# Patient Record
Sex: MALE | Race: WHITE | Employment: FULL TIME | URBAN - METROPOLITAN AREA
[De-identification: names, ages, dates, MRNs, and addresses within clinical notes are randomized per-mention and may not be internally consistent; named-entity substitution may affect disease eponyms.]

---

## 2020-01-23 ENCOUNTER — OFFICE VISIT (OUTPATIENT)
Dept: FAMILY MEDICINE CLINIC | Age: 50
End: 2020-01-23
Payer: COMMERCIAL

## 2020-01-23 VITALS
WEIGHT: 268 LBS | DIASTOLIC BLOOD PRESSURE: 100 MMHG | HEART RATE: 105 BPM | RESPIRATION RATE: 16 BRPM | HEIGHT: 70 IN | BODY MASS INDEX: 38.37 KG/M2 | SYSTOLIC BLOOD PRESSURE: 139 MMHG | OXYGEN SATURATION: 97 %

## 2020-01-23 LAB
A/G RATIO: 1.2 (ref 1.1–2.2)
ALBUMIN SERPL-MCNC: 4.2 G/DL (ref 3.4–5)
ALP BLD-CCNC: 143 U/L (ref 40–129)
ALT SERPL-CCNC: 23 U/L (ref 10–40)
ANION GAP SERPL CALCULATED.3IONS-SCNC: 16 MMOL/L (ref 3–16)
AST SERPL-CCNC: 20 U/L (ref 15–37)
BILIRUB SERPL-MCNC: 1.1 MG/DL (ref 0–1)
BUN BLDV-MCNC: 14 MG/DL (ref 7–20)
CALCIUM SERPL-MCNC: 9.4 MG/DL (ref 8.3–10.6)
CHLORIDE BLD-SCNC: 96 MMOL/L (ref 99–110)
CHOLESTEROL, TOTAL: 133 MG/DL (ref 0–199)
CO2: 24 MMOL/L (ref 21–32)
CREAT SERPL-MCNC: 0.8 MG/DL (ref 0.9–1.3)
GFR AFRICAN AMERICAN: >60
GFR NON-AFRICAN AMERICAN: >60
GLOBULIN: 3.6 G/DL
GLUCOSE BLD-MCNC: 98 MG/DL (ref 70–99)
HDLC SERPL-MCNC: 30 MG/DL (ref 40–60)
LDL CHOLESTEROL CALCULATED: 86 MG/DL
POTASSIUM SERPL-SCNC: 4.4 MMOL/L (ref 3.5–5.1)
SODIUM BLD-SCNC: 136 MMOL/L (ref 136–145)
TOTAL PROTEIN: 7.8 G/DL (ref 6.4–8.2)
TRIGL SERPL-MCNC: 87 MG/DL (ref 0–150)
URIC ACID, SERUM: 7.7 MG/DL (ref 3.5–7.2)
VLDLC SERPL CALC-MCNC: 17 MG/DL

## 2020-01-23 PROCEDURE — G8484 FLU IMMUNIZE NO ADMIN: HCPCS | Performed by: INTERNAL MEDICINE

## 2020-01-23 PROCEDURE — 1036F TOBACCO NON-USER: CPT | Performed by: INTERNAL MEDICINE

## 2020-01-23 PROCEDURE — 36415 COLL VENOUS BLD VENIPUNCTURE: CPT | Performed by: INTERNAL MEDICINE

## 2020-01-23 PROCEDURE — 99203 OFFICE O/P NEW LOW 30 MIN: CPT | Performed by: INTERNAL MEDICINE

## 2020-01-23 PROCEDURE — G8419 CALC BMI OUT NRM PARAM NOF/U: HCPCS | Performed by: INTERNAL MEDICINE

## 2020-01-23 PROCEDURE — G8427 DOCREV CUR MEDS BY ELIG CLIN: HCPCS | Performed by: INTERNAL MEDICINE

## 2020-01-23 RX ORDER — INDOMETHACIN 50 MG/1
50 CAPSULE ORAL 3 TIMES DAILY
COMMUNITY
Start: 2019-11-26 | End: 2020-01-23 | Stop reason: SDUPTHER

## 2020-01-23 RX ORDER — INDOMETHACIN 50 MG/1
50 CAPSULE ORAL 3 TIMES DAILY
Qty: 21 CAPSULE | Refills: 0 | Status: SHIPPED | OUTPATIENT
Start: 2020-01-23 | End: 2020-02-06 | Stop reason: SDUPTHER

## 2020-01-23 RX ORDER — COVID-19 ANTIGEN TEST
220 KIT MISCELLANEOUS
COMMUNITY
End: 2021-05-07

## 2020-01-23 SDOH — HEALTH STABILITY: MENTAL HEALTH: HOW MANY STANDARD DRINKS CONTAINING ALCOHOL DO YOU HAVE ON A TYPICAL DAY?: 1 OR 2

## 2020-01-23 SDOH — HEALTH STABILITY: MENTAL HEALTH: HOW OFTEN DO YOU HAVE A DRINK CONTAINING ALCOHOL?: MONTHLY OR LESS

## 2020-01-23 ASSESSMENT — PATIENT HEALTH QUESTIONNAIRE - PHQ9
SUM OF ALL RESPONSES TO PHQ QUESTIONS 1-9: 0
2. FEELING DOWN, DEPRESSED OR HOPELESS: 0
SUM OF ALL RESPONSES TO PHQ QUESTIONS 1-9: 0
1. LITTLE INTEREST OR PLEASURE IN DOING THINGS: 0
SUM OF ALL RESPONSES TO PHQ9 QUESTIONS 1 & 2: 0

## 2020-01-23 NOTE — PROGRESS NOTES
Estela Gurrola   1970      Reason for visit:   Chief Complaint   Patient presents with    [facility]    Gout    Medication Refill    Back Pain        HPI:  Patient presents to [facility]. He has a history of gout, both musculoskeletal well as uric acid stones that required intervention in the past.  He was previously on allopurinol but due to insurance changes has been off of it for quite some time. Reports present attack in his left knee for the last 2 to 3 weeks. Initially thought it was related to a meniscal tear he had in that knee in the past that was causing swelling, however, the pain worsened and is now red and warm. No fever. Decreased range of motion due to pain. Ports it feels similar to prior gout attacks. He reports Indocin worked very well for him in the past.  He has been taking naproxen/Aleve outside of the office but has not had full relief. He has a history of lumbar disc disease with radiculopathy. He had several herniated disc that are reportedly pressing on his spinal cord. He has been offered surgery per Dr. Dasha Ruiz but it is a Workmen's Comp. case and he reports that he is not yet given approval.  Is currently disabled from this injury and cannot work. He is a non-smoker. He tries to avoid foods that exacerbate his gout including alcohol. He declines a flu shot. His Tdap is due this summer.     Past Medical History:   Diagnosis Date    Gout     Hypertension            Current Outpatient Medications:     Naproxen Sodium (ALEVE) 220 MG CAPS, Take 220 mg by mouth, Disp: , Rfl:     indomethacin (INDOCIN) 50 MG capsule, Take 1 capsule by mouth 3 times daily, Disp: 21 capsule, Rfl: 0    ondansetron (ZOFRAN) 4 MG tablet, Take 1 tablet by mouth every 8 hours as needed for Nausea (Patient not taking: Reported on 1/23/2020), Disp: 20 tablet, Rfl: 0     Allergies   Allergen Reactions    Dilaudid [Hydromorphone Hcl]        Past Surgical History: Procedure Laterality Date    KIDNEY STONE SURGERY      KNEE CARTILAGE SURGERY Right 2018        Family History   Problem Relation Age of Onset    Hypertension Mother     Hypertension Father         Social History     Socioeconomic History    Marital status:      Spouse name: Not on file    Number of children: Not on file    Years of education: Not on file    Highest education level: Not on file   Occupational History    Not on file   Social Needs    Financial resource strain: Not on file    Food insecurity:     Worry: Not on file     Inability: Not on file    Transportation needs:     Medical: Not on file     Non-medical: Not on file   Tobacco Use    Smoking status: Never Smoker    Smokeless tobacco: Never Used   Substance and Sexual Activity    Alcohol use: Yes     Frequency: Monthly or less     Drinks per session: 1 or 2     Binge frequency: Never    Drug use: No    Sexual activity: Yes   Lifestyle    Physical activity:     Days per week: Not on file     Minutes per session: Not on file    Stress: Not on file   Relationships    Social connections:     Talks on phone: Not on file     Gets together: Not on file     Attends Caodaism service: Not on file     Active member of club or organization: Not on file     Attends meetings of clubs or organizations: Not on file     Relationship status: Not on file    Intimate partner violence:     Fear of current or ex partner: Not on file     Emotionally abused: Not on file     Physically abused: Not on file     Forced sexual activity: Not on file   Other Topics Concern    Not on file   Social History Narrative    Not on file       Review of Systems   Constitutional: Negative for chills, fatigue, fever and unexpected weight change. HENT: Negative for congestion, ear pain, sore throat and trouble swallowing. Eyes: Negative for pain and redness. Respiratory: Negative for cough and shortness of breath.     Cardiovascular: Negative for chest

## 2020-01-29 ENCOUNTER — TELEPHONE (OUTPATIENT)
Dept: FAMILY MEDICINE CLINIC | Age: 50
End: 2020-01-29

## 2020-02-06 RX ORDER — INDOMETHACIN 50 MG/1
50 CAPSULE ORAL 3 TIMES DAILY
Qty: 21 CAPSULE | Refills: 0 | Status: SHIPPED | OUTPATIENT
Start: 2020-02-06 | End: 2020-03-05 | Stop reason: SDUPTHER

## 2020-02-06 NOTE — TELEPHONE ENCOUNTER
Patient requesting a medication refill.   Medication: Indomethacin  Dosage: 50 mg  Frequency: Take one capsule by mouth 3 times daily  Last filled on: 01/23/2020  Pharmacy: [facility]  Next office visit: 03/05/2020

## 2020-02-06 NOTE — TELEPHONE ENCOUNTER
Spoke with patient he says its just about gone but he leaves to go out of town Saturday and would like to have on hand in case he has a flare up.

## 2020-03-05 ENCOUNTER — OFFICE VISIT (OUTPATIENT)
Dept: FAMILY MEDICINE CLINIC | Age: 50
End: 2020-03-05
Payer: COMMERCIAL

## 2020-03-05 VITALS
BODY MASS INDEX: 39.03 KG/M2 | SYSTOLIC BLOOD PRESSURE: 143 MMHG | RESPIRATION RATE: 16 BRPM | WEIGHT: 272 LBS | HEART RATE: 90 BPM | OXYGEN SATURATION: 98 % | DIASTOLIC BLOOD PRESSURE: 96 MMHG

## 2020-03-05 PROCEDURE — G8484 FLU IMMUNIZE NO ADMIN: HCPCS | Performed by: INTERNAL MEDICINE

## 2020-03-05 PROCEDURE — G8427 DOCREV CUR MEDS BY ELIG CLIN: HCPCS | Performed by: INTERNAL MEDICINE

## 2020-03-05 PROCEDURE — G8417 CALC BMI ABV UP PARAM F/U: HCPCS | Performed by: INTERNAL MEDICINE

## 2020-03-05 PROCEDURE — 1036F TOBACCO NON-USER: CPT | Performed by: INTERNAL MEDICINE

## 2020-03-05 PROCEDURE — 99213 OFFICE O/P EST LOW 20 MIN: CPT | Performed by: INTERNAL MEDICINE

## 2020-03-05 RX ORDER — LOSARTAN POTASSIUM 25 MG/1
25 TABLET ORAL DAILY
Qty: 30 TABLET | Refills: 3 | Status: SHIPPED | OUTPATIENT
Start: 2020-03-05 | End: 2020-05-05

## 2020-03-05 RX ORDER — INDOMETHACIN 50 MG/1
50 CAPSULE ORAL 3 TIMES DAILY
Qty: 21 CAPSULE | Refills: 0 | Status: SHIPPED | OUTPATIENT
Start: 2020-03-05 | End: 2020-09-11

## 2020-03-05 RX ORDER — ALLOPURINOL 100 MG/1
200 TABLET ORAL DAILY
Qty: 60 TABLET | Refills: 2 | Status: SHIPPED | OUTPATIENT
Start: 2020-03-05 | End: 2020-05-05

## 2020-03-05 ASSESSMENT — ENCOUNTER SYMPTOMS
SINUS PAIN: 0
SHORTNESS OF BREATH: 0
SORE THROAT: 0
WHEEZING: 0
COUGH: 0
NAUSEA: 0
ABDOMINAL PAIN: 0
VOMITING: 0
EYE DISCHARGE: 0
SINUS PRESSURE: 0
EYE REDNESS: 0
DIARRHEA: 0
RHINORRHEA: 0

## 2020-03-05 NOTE — PATIENT INSTRUCTIONS
Patient Education        Purine-Restricted Diet: Care Instructions  Your Care Instructions    Purines are substances that are found in some foods. Your body turns purines into uric acid. High levels of uric acid can cause gout, which is a form of arthritis that causes pain and inflammation in joints. You may be able to help control the amount of uric acid in your body by limiting high-purine foods in your diet. Follow-up care is a key part of your treatment and safety. Be sure to make and go to all appointments, and call your doctor if you are having problems. It's also a good idea to know your test results and keep a list of the medicines you take. How can you care for yourself at home? · Plan your meals and snacks around foods that are low in purines and are safe for you to eat. These foods include:  ? Green vegetables and tomatoes. ? Fruits. ? Whole-grain breads, rice, and cereals. ? Eggs, peanut butter, and nuts. ? Low-fat milk, cheese, and other milk products. ? Popcorn. ? Gelatin desserts, chocolate, cocoa, and cakes and sweets, in small amounts. · You can eat certain foods that are medium-high in purines, but eat them only once in a while. These foods include:  ? Legumes, such as dried beans and dried peas. You can have 1 cup cooked legumes each day. ? Asparagus, cauliflower, spinach, mushrooms, and green peas. ? Fish and seafood (other than very high-purine seafood). ? Oatmeal, wheat bran, and wheat germ. · Limit very high-purine foods, including:  ? Organ meats, such as liver, kidneys, sweetbreads, and brains. ? Meats, including baer, beef, pork, and lamb. ? Game meats and any other meats in large amounts. ? Anchovies, sardines, herring, mackerel, and scallops. ? Gravy. ? Beer. Where can you learn more? Go to [email or web address] and sign in to your [employer] account.  Enter F448 in the [facility] box to learn more about \"Purine-Restricted Diet: Care

## 2020-03-05 NOTE — PROGRESS NOTES
3/5/2020     Lang Gurrola (:  1970) is a 51 y.o. male, here for evaluation of the following medical concerns:    HPI  Presents for follow-up of recent gout attack. He had gout in his left knee at last visit. He confirmed that this was previously diagnosed with an arthrocentesis many years ago. He has had multiple flares in the past as well as multiple kidney stones. Unfortunately, has had 2 more flares since her last visit, most recently in the left elbow. Is still a bit red and swollen. He states that he has been sparingly using the Indocin but feels that he could stop the attack if he had a refill. His blood pressure remains elevated. He had been on medication for his blood pressure in the past, he recalls metoprolol was started by cardiologist but he reports that it actually made his blood pressure worse. No recent chest pain or shortness of breath. No fever. Review of Systems   Constitutional: Negative for chills and fever. HENT: Negative for congestion, ear pain, rhinorrhea, sinus pressure, sinus pain and sore throat. Eyes: Negative for discharge and redness. Respiratory: Negative for cough, shortness of breath and wheezing. Cardiovascular: Negative for chest pain, palpitations and leg swelling. Gastrointestinal: Negative for abdominal pain, diarrhea, nausea and vomiting. Genitourinary: Negative for dysuria. Musculoskeletal: Positive for arthralgias. Negative for myalgias. Skin: Negative for rash. Prior to Visit Medications    Medication Sig Taking?  Authorizing Provider   allopurinol (ZYLOPRIM) 100 MG tablet Take 2 tablets by mouth daily Yes Kevon Nolasco MD   indomethacin (INDOCIN) 50 MG capsule Take 1 capsule by mouth 3 times daily Yes Kevon Nolasco MD   losartan (COZAAR) 25 MG tablet Take 1 tablet by mouth daily Yes Kevon Nolasco MD   Naproxen Sodium (ALEVE) 220 MG CAPS Take 220 mg by mouth  Historical Provider, MD   ondansetron [facility] 4 MG tablet Take 1 tablet by mouth every 8 hours as needed for Nausea  Patient not taking: Reported on 1/23/2020  Ivet Parekh MD        Social History     Tobacco Use    Smoking status: Never Smoker    Smokeless tobacco: Never Used   Substance Use Topics    Alcohol use: Yes     Frequency: Monthly or less     Drinks per session: 1 or 2     Binge frequency: Never        Vitals:    03/05/20 1540 03/05/20 1544   BP: (!) 196/102 (!) 143/96   Pulse: 102 90   Resp: 16    SpO2: 98%    Weight: 272 lb (123.4 kg)      Estimated body mass index is 39.03 kg/m² as calculated from the following:    Height as of 1/23/20: 5' 10\" (1.778 m). Weight as of this encounter: 272 lb (123.4 kg). Physical Exam  Constitutional:       Appearance: Normal appearance. Cardiovascular:      Rate and Rhythm: Normal rate and regular rhythm. Pulses: Normal pulses. Heart sounds: No murmur. Pulmonary:      Effort: Pulmonary effort is normal. No respiratory distress. Breath sounds: Normal breath sounds. No wheezing. Musculoskeletal:         General: Swelling and tenderness present. Right lower leg: No edema. Left lower leg: No edema. Comments: Redness, swelling, tenderness of left elbow   Neurological:      Mental Status: He is alert. ASSESSMENT/PLAN:  1. Acute gout of left knee, unspecified cause  He needs to wait at least several weeks after this acute flare resolves. At that time, he will start allopurinol. He can start 1 tablet daily and as long as he does not have a precipitant attack, he can increase to 2 tablets and we will plan to recheck his uric acid at follow-up. He will call in the interim for any issues. - indomethacin (INDOCIN) 50 MG capsule; Take 1 capsule by mouth 3 times daily  Dispense: 21 capsule; Refill: 0    2. Essential hypertension  Pressure is above goal.  Recommend losartan therapy. Potential adverse effects reviewed. He will notify me should he develop.

## 2020-04-27 ENCOUNTER — TELEPHONE (OUTPATIENT)
Dept: FAMILY MEDICINE CLINIC | Age: 50
End: 2020-04-27

## 2020-05-05 ENCOUNTER — VIRTUAL VISIT (OUTPATIENT)
Dept: FAMILY MEDICINE CLINIC | Age: 50
End: 2020-05-05
Payer: COMMERCIAL

## 2020-05-05 PROCEDURE — G8427 DOCREV CUR MEDS BY ELIG CLIN: HCPCS | Performed by: INTERNAL MEDICINE

## 2020-05-05 PROCEDURE — 99214 OFFICE O/P EST MOD 30 MIN: CPT | Performed by: INTERNAL MEDICINE

## 2020-05-05 RX ORDER — LOSARTAN POTASSIUM 100 MG/1
100 TABLET ORAL DAILY
Qty: 30 TABLET | Refills: 5 | Status: SHIPPED | OUTPATIENT
Start: 2020-05-05 | End: 2020-10-19

## 2020-05-05 RX ORDER — ALLOPURINOL 300 MG/1
300 TABLET ORAL DAILY
Qty: 30 TABLET | Refills: 5 | Status: SHIPPED | OUTPATIENT
Start: 2020-05-05 | End: 2020-11-03

## 2020-05-05 ASSESSMENT — ENCOUNTER SYMPTOMS: SHORTNESS OF BREATH: 0

## 2020-05-05 NOTE — PROGRESS NOTES
2020    TELEHEALTH EVALUATION -- Audio/Visual (During ZEUES-85 public health emergency)    HPI:    Petr Fabian (:  1970) has requested an audio/video evaluation for the following concern(s):    Patient presents for follow-up of hypertension and gout. He was started on allopurinol prophylaxis after his last attack. He was feeling good for a while and was able to start the medication without precipitating a flare. He is taking allopurinol 200 mg and feels that it has really helped decrease his flares but he still having some intermittent gout flares in his elbow. He is doing well on losartan therapy. No adverse effects but he has noted that his blood pressure is still high. He had initially taken 2 tablets of the losartan as he thought that is what he was supposed to do reports he did not really have any major impact on his blood pressure. Recent readings are as follows  154/103  160/114  Does have concerns today regarding insomnia and vivid dreams. He reports that he wakes up frequently throughout the night and then cannot fall back asleep. He does snore. His partner has witnessed apnea in the past.  He is sleepy in the daytime. He is obese. He has a large neck circumference. he denies anxiety/depression  Review of Systems   Respiratory: Negative for shortness of breath. Cardiovascular: Negative for chest pain and leg swelling. Musculoskeletal: Positive for arthralgias and joint swelling. Neurological: Negative for dizziness and light-headedness. Psychiatric/Behavioral: Positive for sleep disturbance. Negative for dysphoric mood and suicidal ideas. The patient is not nervous/anxious. Prior to Visit Medications    Medication Sig Taking?  Authorizing Provider   allopurinol (ZYLOPRIM) 300 MG tablet Take 1 tablet by mouth daily Yes Gianni Miner MD   losartan (COZAAR) 100 MG tablet Take 1 tablet by mouth daily Yes Gianni Miner MD   indomethacin (INDOCIN) 50 MG

## 2020-09-14 RX ORDER — INDOMETHACIN 50 MG/1
CAPSULE ORAL
Qty: 90 CAPSULE | Refills: 3 | Status: SHIPPED | OUTPATIENT
Start: 2020-09-14 | End: 2021-05-07 | Stop reason: SDUPTHER

## 2020-09-14 NOTE — TELEPHONE ENCOUNTER
Can you find out from patient if he is still taking this? Should not be taking regularly, only as needed. Can you call pharmacy to change rx to TID PRN for gout flare? No refills.  I apologize

## 2020-09-15 NOTE — TELEPHONE ENCOUNTER
PT's wife Argelia Cho called in (Okay to speak to per HIPAA) regarding refill for this medication. She says that PT is only taking as needed.  Did adv that refill was sent to the pharmacy yesterday

## 2020-10-19 ENCOUNTER — TELEPHONE (OUTPATIENT)
Dept: FAMILY MEDICINE CLINIC | Age: 50
End: 2020-10-19

## 2020-10-19 NOTE — TELEPHONE ENCOUNTER
PT's wife Elena Suarez called in stating PT's blood pressure has been running high. He's been taking his blood pressure medication every morning. Elena Daniela stated his most recent blood pressure reading was 158/105. She's not sure if he needs an adjustment with his medication or not.      Please call Elena Suarez back: [phone]

## 2020-10-20 RX ORDER — AMLODIPINE BESYLATE 5 MG/1
5 TABLET ORAL DAILY
Qty: 30 TABLET | Refills: 0 | Status: SHIPPED | OUTPATIENT
Start: 2020-10-20 | End: 2020-11-16

## 2020-11-03 RX ORDER — ALLOPURINOL 300 MG/1
TABLET ORAL
Qty: 30 TABLET | Refills: 4 | Status: SHIPPED | OUTPATIENT
Start: 2020-11-03 | End: 2021-03-31

## 2020-11-17 RX ORDER — AMLODIPINE BESYLATE 5 MG/1
TABLET ORAL
Qty: 30 TABLET | Refills: 0 | Status: SHIPPED | OUTPATIENT
Start: 2020-11-17 | End: 2020-12-02 | Stop reason: SDUPTHER

## 2020-11-24 RX ORDER — AMLODIPINE BESYLATE 5 MG/1
TABLET ORAL
Qty: 30 TABLET | Refills: 0 | OUTPATIENT
Start: 2020-11-24

## 2020-12-02 ENCOUNTER — OFFICE VISIT (OUTPATIENT)
Dept: FAMILY MEDICINE CLINIC | Age: 50
End: 2020-12-02
Payer: COMMERCIAL

## 2020-12-02 VITALS
SYSTOLIC BLOOD PRESSURE: 166 MMHG | WEIGHT: 293 LBS | HEIGHT: 70 IN | TEMPERATURE: 97.7 F | HEART RATE: 99 BPM | BODY MASS INDEX: 41.95 KG/M2 | DIASTOLIC BLOOD PRESSURE: 107 MMHG

## 2020-12-02 PROCEDURE — 1036F TOBACCO NON-USER: CPT | Performed by: FAMILY MEDICINE

## 2020-12-02 PROCEDURE — 99213 OFFICE O/P EST LOW 20 MIN: CPT | Performed by: FAMILY MEDICINE

## 2020-12-02 PROCEDURE — G8417 CALC BMI ABV UP PARAM F/U: HCPCS | Performed by: FAMILY MEDICINE

## 2020-12-02 PROCEDURE — G8484 FLU IMMUNIZE NO ADMIN: HCPCS | Performed by: FAMILY MEDICINE

## 2020-12-02 PROCEDURE — G8427 DOCREV CUR MEDS BY ELIG CLIN: HCPCS | Performed by: FAMILY MEDICINE

## 2020-12-02 RX ORDER — LOSARTAN POTASSIUM 100 MG/1
TABLET ORAL
Qty: 30 TABLET | Refills: 0 | Status: SHIPPED | OUTPATIENT
Start: 2020-12-02 | End: 2021-01-04 | Stop reason: SDUPTHER

## 2020-12-02 RX ORDER — AMLODIPINE BESYLATE 5 MG/1
TABLET ORAL
Qty: 30 TABLET | Refills: 0 | Status: SHIPPED | OUTPATIENT
Start: 2020-12-02 | End: 2021-01-04 | Stop reason: SDUPTHER

## 2020-12-02 NOTE — PROGRESS NOTES
PROGRESS NOTE     Michelle Peacock MD  [address] [facility]  [place]   [phone] office  [phone] fax    Date of Service:  12/2/2020    Subjective:      Patient ID: Dunia Bernardo is a 53 y.o. male      CC: HTN,acute illness    HPI   Of note, medical assistant was able to room patient and get his vitals, but noticed he was coughing. Once it was discovered he had a respiratory illness, I let him know we would have to do the rest of the visit virtually. Patient was escorted out to his car, at which point I called him on the telephone to finish the encounter. Acute illness  Patient has been coughing up mucus for the last 5 days. He notices some nasal congestion and headache as well. He denies sinus pain or pressure, neck pain, myalgias, fever, rhinitis, chest pain, shortness of breath, abdominal pain, loss of taste or smell, nausea, vomiting, diarrhea        Hypertension:    Amlodipine 5mg was added to losartan 100mg via telephone by Dr Kenny Amaya in getting of October 2020. Patient did not understand the directions, and stopped his losartan 100 mg completely. He states his blood pressure has been high since. Patient misunderstood and stopped his losartan altogether. He is not adherent to a low sodium diet. Patient denies chest pain, shortness of breath, lightheadedness, blurred vision, peripheral edema, palpitations, dry cough and fatigue. Antihypertensive medication side effects: no medication side effects noted. Use of agents associated with hypertension: none.                                         Sodium (mmol/L)   Date Value   01/23/2020 136    BUN (mg/dL)   Date Value   01/23/2020 14    Glucose (mg/dL)   Date Value   01/23/2020 98      Potassium (mmol/L)   Date Value   01/23/2020 4.4    CREATININE (mg/dL)   Date Value   01/23/2020 0.8 (L)             Vitals:    12/02/20 1445   BP: (!) 166/107   Pulse: 99   Temp: 97.7 °F (36.5 °C) schedule. Reviewed CDC isolation guidelines. Patient expressed understanding. Did review red flag symptoms of COVID-19, and when to seek emergency care. Patient expressed understanding.

## 2020-12-14 ENCOUNTER — TELEPHONE (OUTPATIENT)
Dept: FAMILY MEDICINE CLINIC | Age: 50
End: 2020-12-14

## 2020-12-14 NOTE — TELEPHONE ENCOUNTER
Ok to reschedule the first week of January    Please document call and then close encounter.   thanks

## 2020-12-14 NOTE — TELEPHONE ENCOUNTER
Received message from the call center. PT has an appointment scheduled for 12/23 with Dr. Maryjo Herndon but would like to move to the week of 12/28. No availabilities. Please call if PT is able to move appointment.      Best call back number: [phone]

## 2020-12-15 ENCOUNTER — TELEPHONE (OUTPATIENT)
Dept: FAMILY MEDICINE CLINIC | Age: 50
End: 2020-12-15

## 2020-12-15 NOTE — TELEPHONE ENCOUNTER
----- Message from Ryan Mckeon sent at 12/15/2020  2:24 PM EST -----  Subject: Message to Provider    QUESTIONS  Information for Provider? pt's wife called to reschedule tht appt for   12/23. No appts available in 1st week of Jan. Pt's wife said to schedule   even though it is for 2/1. This is a reschedule that was supposed to be   1st week in Jan.   ---------------------------------------------------------------------------  --------------  [address]  What is the best way for the office to contact you? OK to leave message on   voicemail  Preferred Call Back Phone Number? [phone]  ---------------------------------------------------------------------------  --------------  SCRIPT ANSWERS  Relationship to Patient?  Self

## 2020-12-15 NOTE — TELEPHONE ENCOUNTER
Please call pt and offer one of my same day visit slots the first week of January, assuming they still want to be seen then    Please document call and then close encounter.   thanks

## 2021-01-03 NOTE — PROGRESS NOTES
PROGRESS NOTE     Kim Dotson MD  [address] [facility]  [place] [address]  [phone] office  [phone] fax    Date of Service:  1/4/2021    Subjective:      Patient ID: . Aggie Isbell is a 53 y.o. male      CC: HTN    HPI       Hypertension:    --one month ago losartan 100mg was restarted and amlodipine 5mg was continued. Asked to f/u today for re-evaluation    --home BP now 140-150/80--100    --Patient states over the last couple of weeks he has noticed more bloating in his abdomen. He is concerned this is related to the new losartan medication that he is taking. He does admit to also changing his diet at this time. He is trying to eat more fruits and vegetables and less unhealthy food. He is not adherent to a low sodium diet. Patient denies chest pain, shortness of breath, lightheadedness, blurred vision, peripheral edema, palpitations, dry cough and fatigue. Antihypertensive medication side effects: no medication side effects noted. Use of agents associated with hypertension: none.                                         Sodium (mmol/L)   Date Value   01/23/2020 136    BUN (mg/dL)   Date Value   01/23/2020 14    Glucose (mg/dL)   Date Value   01/23/2020 98      Potassium (mmol/L)   Date Value   01/23/2020 4.4    CREATININE (mg/dL)   Date Value   01/23/2020 0.8 (L)             Vitals:    01/04/21 1259 01/04/21 1311   BP: (!) 173/96 136/87   Pulse: 112 102   Temp: 98.4 °F (36.9 °C)    TempSrc: Infrared    Weight: 297 lb (134.7 kg)    Height: 5' 10\" (1.778 m)        Outpatient Medications Marked as Taking for the 1/4/21 encounter (Office Visit) with Violetta Clinton MD   Medication Sig Dispense Refill    losartan (COZAAR) 100 MG tablet TAKE ONE TABLET BY MOUTH DAILY 30 tablet 0    amLODIPine (NORVASC) 5 MG tablet TAKE ONE TABLET BY MOUTH DAILY 30 tablet 0    allopurinol (ZYLOPRIM) 300 MG tablet TAKE ONE TABLET BY MOUTH DAILY 30 tablet 4  indomethacin (INDOCIN) 50 MG capsule TAKE ONE CAPSULE BY MOUTH THREE TIMES A DAY 90 capsule 3    Naproxen Sodium (ALEVE) 220 MG CAPS Take 220 mg by mouth         Past Medical History:   Diagnosis Date    Gout     Hypertension        Past Surgical History:   Procedure Laterality Date    KIDNEY STONE SURGERY      KNEE CARTILAGE SURGERY Right 2018       Social History     Tobacco Use    Smoking status: Never Smoker    Smokeless tobacco: Never Used   Substance Use Topics    Alcohol use: Yes     Frequency: Monthly or less     Drinks per session: 1 or 2     Binge frequency: Never       Family History   Problem Relation Age of Onset    Hypertension Mother     Hypertension Father            Review of Systems  See hpi    Objective:   Constitutional:   · Reviewed vitals above  · Well Nourished, well developed, no distress       Resp:  · Normal effort  Musculoskeletal:  · Normal Gait  Skin:  · No rashes on inspection  Psych:  · Normal mood and affect  · Normal insight and judgement    Assessment / Plan:     1. Essential hypertension  Improved but not completely controlled, especially with home blood pressures. Increasing amlodipine to 5 mg. Continuing losartan 100 mg. Checking BMP. Follow-up in 1 week for reevaluation. --Of note, told him I do not believe the losartan is causing abdominal bloating, this is likely due to his recent dietary changes. Did give him information on FODMAP diet to see if he can figure out triggers.     --checking COVID antibody as had resp illness a month ago and wants to see if this was COVID

## 2021-01-04 ENCOUNTER — OFFICE VISIT (OUTPATIENT)
Dept: FAMILY MEDICINE CLINIC | Age: 51
End: 2021-01-04
Payer: COMMERCIAL

## 2021-01-04 VITALS
DIASTOLIC BLOOD PRESSURE: 87 MMHG | BODY MASS INDEX: 42.52 KG/M2 | WEIGHT: 297 LBS | HEART RATE: 102 BPM | SYSTOLIC BLOOD PRESSURE: 136 MMHG | TEMPERATURE: 98.4 F | HEIGHT: 70 IN

## 2021-01-04 DIAGNOSIS — Z01.84 IMMUNITY STATUS TESTING: ICD-10-CM

## 2021-01-04 DIAGNOSIS — I10 ESSENTIAL HYPERTENSION: Primary | ICD-10-CM

## 2021-01-04 DIAGNOSIS — Z23 NEEDS FLU SHOT: ICD-10-CM

## 2021-01-04 LAB
ANION GAP SERPL CALCULATED.3IONS-SCNC: 14 MMOL/L (ref 3–16)
BUN BLDV-MCNC: 11 MG/DL (ref 7–20)
CALCIUM SERPL-MCNC: 9.3 MG/DL (ref 8.3–10.6)
CHLORIDE BLD-SCNC: 105 MMOL/L (ref 99–110)
CO2: 23 MMOL/L (ref 21–32)
CREAT SERPL-MCNC: 0.9 MG/DL (ref 0.9–1.3)
GFR AFRICAN AMERICAN: >60
GFR NON-AFRICAN AMERICAN: >60
GLUCOSE BLD-MCNC: 106 MG/DL (ref 70–99)
POTASSIUM SERPL-SCNC: 4.2 MMOL/L (ref 3.5–5.1)
SARS-COV-2 ANTIBODY, TOTAL: POSITIVE
SODIUM BLD-SCNC: 142 MMOL/L (ref 136–145)

## 2021-01-04 PROCEDURE — 1036F TOBACCO NON-USER: CPT | Performed by: FAMILY MEDICINE

## 2021-01-04 PROCEDURE — G8417 CALC BMI ABV UP PARAM F/U: HCPCS | Performed by: FAMILY MEDICINE

## 2021-01-04 PROCEDURE — 99213 OFFICE O/P EST LOW 20 MIN: CPT | Performed by: FAMILY MEDICINE

## 2021-01-04 PROCEDURE — G8484 FLU IMMUNIZE NO ADMIN: HCPCS | Performed by: FAMILY MEDICINE

## 2021-01-04 PROCEDURE — 36415 COLL VENOUS BLD VENIPUNCTURE: CPT | Performed by: FAMILY MEDICINE

## 2021-01-04 PROCEDURE — G8427 DOCREV CUR MEDS BY ELIG CLIN: HCPCS | Performed by: FAMILY MEDICINE

## 2021-01-04 RX ORDER — AMLODIPINE BESYLATE 10 MG/1
TABLET ORAL
Qty: 90 TABLET | Refills: 0 | Status: SHIPPED | OUTPATIENT
Start: 2021-01-04 | End: 2021-04-15 | Stop reason: SDUPTHER

## 2021-01-04 RX ORDER — LOSARTAN POTASSIUM 100 MG/1
TABLET ORAL
Qty: 90 TABLET | Refills: 0 | Status: SHIPPED | OUTPATIENT
Start: 2021-01-04 | End: 2021-04-28 | Stop reason: SDUPTHER

## 2021-01-06 ENCOUNTER — TELEPHONE (OUTPATIENT)
Dept: ADMINISTRATIVE | Age: 51
End: 2021-01-06

## 2021-01-24 DIAGNOSIS — I10 ESSENTIAL HYPERTENSION: ICD-10-CM

## 2021-01-25 RX ORDER — AMLODIPINE BESYLATE 5 MG/1
TABLET ORAL
Qty: 30 TABLET | Refills: 0 | OUTPATIENT
Start: 2021-01-25

## 2021-03-31 RX ORDER — ALLOPURINOL 300 MG/1
TABLET ORAL
Qty: 30 TABLET | Refills: 3 | Status: SHIPPED | OUTPATIENT
Start: 2021-03-31 | End: 2021-04-28 | Stop reason: SDUPTHER

## 2021-04-14 ENCOUNTER — TELEPHONE (OUTPATIENT)
Dept: FAMILY MEDICINE CLINIC | Age: 51
End: 2021-04-14

## 2021-04-14 NOTE — TELEPHONE ENCOUNTER
Subject: Appointment Request     Reason for Call: Routine Existing Condition Follow Up     QUESTIONS   Type of Appointment? Established Patient   Reason for appointment request? Available appointments did not meet   patient need   Additional Information for Provider? Pt would like to come in ASAP for   amLODIPine refill as he is running out- wants in person can only come in   after 3pm.   ---------------------------------------------------------------------------   --------------   CALL BACK INFO   What is the best way for the office to contact you? OK to leave message on   voicemail   Preferred Call Back Phone Number? [phone]   ---------------------------------------------------------------------------   --------------   SCRIPT ANSWERS   Relationship to Patient? Other   Representative Name? Wife- Kelsey Thompson   Additional information verified (besides Name and Date of Birth)? Address   Appointment reason? Well Care/Follow Ups   Select a Well Care/Follow Ups appointment reason? Adult Existing Condition   Follow Up [Diabetes    CHF    COPD    Hypertension/Blood Pressure Check]   (Is the patient requesting to be seen urgently for their symptoms?)? No   Is this follow up request related to routine Diabetes Management? No   Are you having any new concerns about your existing condition? No   Have you been diagnosed with    tested for    or told that you are suspected of having COVID-19 (Coronavirus)? Yes   Did your symptoms begin or have you been tested for COVID-19 in the last   10 days? No   Have you had a fever or taken medication to treat a fever within the past   3 days? No   Have you had a cough    shortness of breath or flu-like symptoms within the past 3 days? No   Do you currently have flu-like symptoms including fever or chills    cough    shortness of breath    or difficulty breathing    or new loss of taste or smell? No   (Service Expert - click yes below to proceed with [employer] As Usual   Scheduling)? Yes

## 2021-04-15 ENCOUNTER — TELEPHONE (OUTPATIENT)
Dept: FAMILY MEDICINE CLINIC | Age: 51
End: 2021-04-15

## 2021-04-15 DIAGNOSIS — I10 ESSENTIAL HYPERTENSION: ICD-10-CM

## 2021-04-15 RX ORDER — AMLODIPINE BESYLATE 10 MG/1
TABLET ORAL
Qty: 30 TABLET | Refills: 0 | Status: SHIPPED | OUTPATIENT
Start: 2021-04-15 | End: 2021-04-28 | Stop reason: SDUPTHER

## 2021-04-28 ENCOUNTER — TELEPHONE (OUTPATIENT)
Dept: FAMILY MEDICINE CLINIC | Age: 51
End: 2021-04-28

## 2021-04-28 DIAGNOSIS — I10 ESSENTIAL HYPERTENSION: ICD-10-CM

## 2021-04-28 DIAGNOSIS — M10.9 ACUTE GOUT OF LEFT KNEE, UNSPECIFIED CAUSE: ICD-10-CM

## 2021-04-28 RX ORDER — INDOMETHACIN 50 MG/1
CAPSULE ORAL
Qty: 270 CAPSULE | Refills: 3 | OUTPATIENT
Start: 2021-04-28

## 2021-04-28 RX ORDER — ALLOPURINOL 300 MG/1
TABLET ORAL
Qty: 90 TABLET | Refills: 3 | Status: SHIPPED | OUTPATIENT
Start: 2021-04-28 | End: 2022-02-04 | Stop reason: SDUPTHER

## 2021-04-28 RX ORDER — LOSARTAN POTASSIUM 100 MG/1
TABLET ORAL
Qty: 90 TABLET | Refills: 3 | Status: SHIPPED | OUTPATIENT
Start: 2021-04-28 | End: 2022-02-04 | Stop reason: SDUPTHER

## 2021-04-28 RX ORDER — AMLODIPINE BESYLATE 10 MG/1
TABLET ORAL
Qty: 90 TABLET | Refills: 3 | Status: SHIPPED | OUTPATIENT
Start: 2021-04-28 | End: 2021-05-17

## 2021-04-28 NOTE — TELEPHONE ENCOUNTER
PT's wife Smita Jarrell called in stating that per [facility] they have tried to reach out to our office regarding refills for all of PT's medications but have not heard from us. Please send refills (she said all medications) through [facility].

## 2021-05-07 ENCOUNTER — OFFICE VISIT (OUTPATIENT)
Dept: FAMILY MEDICINE CLINIC | Age: 51
End: 2021-05-07
Payer: COMMERCIAL

## 2021-05-07 VITALS
RESPIRATION RATE: 16 BRPM | BODY MASS INDEX: 41.32 KG/M2 | WEIGHT: 288 LBS | DIASTOLIC BLOOD PRESSURE: 82 MMHG | OXYGEN SATURATION: 98 % | HEART RATE: 107 BPM | SYSTOLIC BLOOD PRESSURE: 132 MMHG

## 2021-05-07 DIAGNOSIS — Z13.220 SCREENING, LIPID: ICD-10-CM

## 2021-05-07 DIAGNOSIS — M1A.00X0 IDIOPATHIC CHRONIC GOUT WITHOUT TOPHUS, UNSPECIFIED SITE: ICD-10-CM

## 2021-05-07 DIAGNOSIS — I10 ESSENTIAL HYPERTENSION: Primary | ICD-10-CM

## 2021-05-07 DIAGNOSIS — L30.8 OTHER ECZEMA: ICD-10-CM

## 2021-05-07 DIAGNOSIS — M25.561 CHRONIC PAIN OF RIGHT KNEE: ICD-10-CM

## 2021-05-07 DIAGNOSIS — G89.29 CHRONIC PAIN OF RIGHT KNEE: ICD-10-CM

## 2021-05-07 LAB
A/G RATIO: 1.5 (ref 1.1–2.2)
ALBUMIN SERPL-MCNC: 4.6 G/DL (ref 3.4–5)
ALP BLD-CCNC: 171 U/L (ref 40–129)
ALT SERPL-CCNC: 41 U/L (ref 10–40)
ANION GAP SERPL CALCULATED.3IONS-SCNC: 15 MMOL/L (ref 3–16)
AST SERPL-CCNC: 33 U/L (ref 15–37)
BASOPHILS ABSOLUTE: 0.1 K/UL (ref 0–0.2)
BASOPHILS RELATIVE PERCENT: 0.9 %
BILIRUB SERPL-MCNC: 0.8 MG/DL (ref 0–1)
BUN BLDV-MCNC: 15 MG/DL (ref 7–20)
CALCIUM SERPL-MCNC: 8.8 MG/DL (ref 8.3–10.6)
CHLORIDE BLD-SCNC: 101 MMOL/L (ref 99–110)
CHOLESTEROL, TOTAL: 152 MG/DL (ref 0–199)
CO2: 22 MMOL/L (ref 21–32)
CREAT SERPL-MCNC: 0.9 MG/DL (ref 0.9–1.3)
EOSINOPHILS ABSOLUTE: 0.3 K/UL (ref 0–0.6)
EOSINOPHILS RELATIVE PERCENT: 3.4 %
GFR AFRICAN AMERICAN: >60
GFR NON-AFRICAN AMERICAN: >60
GLOBULIN: 3 G/DL
GLUCOSE BLD-MCNC: 88 MG/DL (ref 70–99)
HCT VFR BLD CALC: 44.1 % (ref 40.5–52.5)
HDLC SERPL-MCNC: 27 MG/DL (ref 40–60)
HEMOGLOBIN: 15.1 G/DL (ref 13.5–17.5)
LDL CHOLESTEROL CALCULATED: 81 MG/DL
LYMPHOCYTES ABSOLUTE: 2.6 K/UL (ref 1–5.1)
LYMPHOCYTES RELATIVE PERCENT: 29.8 %
MCH RBC QN AUTO: 30.6 PG (ref 26–34)
MCHC RBC AUTO-ENTMCNC: 34.1 G/DL (ref 31–36)
MCV RBC AUTO: 89.8 FL (ref 80–100)
MONOCYTES ABSOLUTE: 0.7 K/UL (ref 0–1.3)
MONOCYTES RELATIVE PERCENT: 8.5 %
NEUTROPHILS ABSOLUTE: 5 K/UL (ref 1.7–7.7)
NEUTROPHILS RELATIVE PERCENT: 57.4 %
PDW BLD-RTO: 13.7 % (ref 12.4–15.4)
PLATELET # BLD: 303 K/UL (ref 135–450)
PMV BLD AUTO: 8.7 FL (ref 5–10.5)
POTASSIUM SERPL-SCNC: 4.1 MMOL/L (ref 3.5–5.1)
RBC # BLD: 4.92 M/UL (ref 4.2–5.9)
SODIUM BLD-SCNC: 138 MMOL/L (ref 136–145)
TOTAL PROTEIN: 7.6 G/DL (ref 6.4–8.2)
TRIGL SERPL-MCNC: 218 MG/DL (ref 0–150)
URIC ACID, SERUM: 5.7 MG/DL (ref 3.5–7.2)
VLDLC SERPL CALC-MCNC: 44 MG/DL
WBC # BLD: 8.7 K/UL (ref 4–11)

## 2021-05-07 PROCEDURE — 99214 OFFICE O/P EST MOD 30 MIN: CPT | Performed by: INTERNAL MEDICINE

## 2021-05-07 PROCEDURE — 36415 COLL VENOUS BLD VENIPUNCTURE: CPT | Performed by: INTERNAL MEDICINE

## 2021-05-07 PROCEDURE — G8427 DOCREV CUR MEDS BY ELIG CLIN: HCPCS | Performed by: INTERNAL MEDICINE

## 2021-05-07 PROCEDURE — G8417 CALC BMI ABV UP PARAM F/U: HCPCS | Performed by: INTERNAL MEDICINE

## 2021-05-07 PROCEDURE — 1036F TOBACCO NON-USER: CPT | Performed by: INTERNAL MEDICINE

## 2021-05-07 RX ORDER — TRIAMCINOLONE ACETONIDE 5 MG/G
CREAM TOPICAL
Qty: 15 G | Refills: 0 | Status: SHIPPED | OUTPATIENT
Start: 2021-05-07 | End: 2021-05-14

## 2021-05-07 RX ORDER — INDOMETHACIN 50 MG/1
CAPSULE ORAL
Qty: 90 CAPSULE | Refills: 0 | Status: SHIPPED | OUTPATIENT
Start: 2021-05-07 | End: 2022-10-20

## 2021-05-07 ASSESSMENT — PATIENT HEALTH QUESTIONNAIRE - PHQ9
SUM OF ALL RESPONSES TO PHQ QUESTIONS 1-9: 0
SUM OF ALL RESPONSES TO PHQ9 QUESTIONS 1 & 2: 0
1. LITTLE INTEREST OR PLEASURE IN DOING THINGS: 0

## 2021-05-07 ASSESSMENT — ENCOUNTER SYMPTOMS: SHORTNESS OF BREATH: 0

## 2021-05-07 NOTE — PROGRESS NOTES
pain in his right knee however. He is not sure if this is truly gout. He typically will take it once the pain comes on and by morning it is gone. He does have chronic issues with that knee. He has seen 2 surgeons previously. He was told by one surgeon that he may need a knee replacement but the other surgeon told that he did not. He is about to start seeing a disability doctor through his work as this was originally [employer] case. He reports that the knee often swells. The pain comes and goes. Again, he is not sure that this is gout. Also concerns regarding dry skin on his hands. It can be very pruritic at times. Typically occurs over the MCPs. He is using moisturizing cream on it, unsure of name. He has tried his mother's steroid cream on it and this really helped. The 10-year ASCVD risk score (Eliza Aguilera, et al., 2013) is: 3.7%    Values used to calculate the score:      Age: 53 years      Sex: Male      Is Non- : No      Diabetic: No      Tobacco smoker: No      Systolic Blood Pressure: 918 mmHg      Is BP treated: Yes      HDL Cholesterol: 30 mg/dL      Total Cholesterol: 133 mg/dL    Review of Systems   Respiratory: Negative for shortness of breath. Cardiovascular: Negative for chest pain, palpitations and leg swelling. Musculoskeletal: Positive for arthralgias, gait problem and joint swelling. Skin: Positive for rash. Negative for wound. Neurological: Negative for dizziness and light-headedness. Physical Exam  Constitutional:       Appearance: Normal appearance. HENT:      Head: Normocephalic and atraumatic. Cardiovascular:      Rate and Rhythm: Normal rate and regular rhythm. Heart sounds: No murmur. Pulmonary:      Effort: Pulmonary effort is normal. No respiratory distress. Breath sounds: Normal breath sounds. No wheezing or rales. Skin:     General: Skin is warm.       Comments: Dry eczematous skin over left MCPs

## 2021-05-11 ENCOUNTER — TELEPHONE (OUTPATIENT)
Dept: FAMILY MEDICINE CLINIC | Age: 51
End: 2021-05-11

## 2021-05-11 NOTE — TELEPHONE ENCOUNTER
PT called in regarding his lab results. Informed PT of Danuta's note. PT says that Cecy Rivera had him on a medication for gout (Colcrist?) 3 times a day every day for 4 months. When PT complained of pain, they took him off that and then checked his levels once off the medication and he was fine.      Best call back number: [phone]

## 2021-05-16 DIAGNOSIS — I10 ESSENTIAL HYPERTENSION: ICD-10-CM

## 2021-05-17 RX ORDER — AMLODIPINE BESYLATE 10 MG/1
TABLET ORAL
Qty: 30 TABLET | Refills: 0 | Status: SHIPPED | OUTPATIENT
Start: 2021-05-17 | End: 2022-02-04 | Stop reason: SDUPTHER

## 2021-07-19 ENCOUNTER — TELEPHONE (OUTPATIENT)
Dept: FAMILY MEDICINE CLINIC | Age: 51
End: 2021-07-19

## 2021-07-19 NOTE — TELEPHONE ENCOUNTER
Fell on hand- meaty part of thumb has laceration. Was bleeding, but has put compress on it. Wanting to know if should be seen here, ER, or urgent care. Please advise. Injury happened at home about 30 minutes ago.  Please call back at  [phone]

## 2021-09-17 ENCOUNTER — TELEPHONE (OUTPATIENT)
Dept: FAMILY MEDICINE CLINIC | Age: 51
End: 2021-09-17

## 2021-09-17 DIAGNOSIS — G89.29 CHRONIC SHOULDER PAIN, UNSPECIFIED LATERALITY: Primary | ICD-10-CM

## 2021-09-17 DIAGNOSIS — M25.519 CHRONIC SHOULDER PAIN, UNSPECIFIED LATERALITY: Primary | ICD-10-CM

## 2021-09-17 NOTE — TELEPHONE ENCOUNTER
----- Message from Beckie Castillo sent at 9/17/2021  9:41 AM EDT -----  Subject: Referral Request    QUESTIONS   Reason for referral request? Patient would like to see an orthopedic   doctor for his shoulder issues. Has the physician seen you for this condition before? No   Preferred Specialist (if applicable)? Do you already have an appointment scheduled? No  Additional Information for Provider?   ---------------------------------------------------------------------------  --------------  CALL BACK INFO  What is the best way for the office to contact you? OK to leave message on   voicemail  Preferred Call Back Phone Number?  [phone]

## 2021-09-28 ENCOUNTER — OFFICE VISIT (OUTPATIENT)
Dept: ORTHOPEDIC SURGERY | Age: 51
End: 2021-09-28
Payer: COMMERCIAL

## 2021-09-28 VITALS — BODY MASS INDEX: 36.4 KG/M2 | HEIGHT: 71 IN | WEIGHT: 260 LBS

## 2021-09-28 DIAGNOSIS — M25.511 ACUTE PAIN OF RIGHT SHOULDER: Primary | ICD-10-CM

## 2021-09-28 DIAGNOSIS — M19.011 LOCALIZED OSTEOARTHRITIS OF RIGHT SHOULDER: ICD-10-CM

## 2021-09-28 DIAGNOSIS — M67.911 TENDINOPATHY OF RIGHT ROTATOR CUFF: ICD-10-CM

## 2021-09-28 PROCEDURE — 99203 OFFICE O/P NEW LOW 30 MIN: CPT | Performed by: FAMILY MEDICINE

## 2021-09-28 PROCEDURE — G8417 CALC BMI ABV UP PARAM F/U: HCPCS | Performed by: FAMILY MEDICINE

## 2021-09-28 PROCEDURE — G8427 DOCREV CUR MEDS BY ELIG CLIN: HCPCS | Performed by: FAMILY MEDICINE

## 2021-09-28 RX ORDER — METHYLPREDNISOLONE 4 MG/1
TABLET ORAL
Qty: 21 KIT | Refills: 0 | Status: SHIPPED | OUTPATIENT
Start: 2021-09-28 | End: 2021-10-19 | Stop reason: ALTCHOICE

## 2021-09-28 RX ORDER — DICLOFENAC SODIUM 75 MG/1
75 TABLET, DELAYED RELEASE ORAL 2 TIMES DAILY
Qty: 60 TABLET | Refills: 3 | Status: SHIPPED | OUTPATIENT
Start: 2021-09-28 | End: 2021-12-14

## 2021-09-28 NOTE — PROGRESS NOTES
Chief Complaint    Shoulder Pain (N RIGHT SHOULDER)    Initial consultation acute on chronic right shoulder pain with weakness and motion loss    History of Present Illness:  Anders Ibrahim is a 53 y.o. male is a very pleasant right-hand-dominant white male who is currently on disability and is now several years out from a work-related injury in which he fell out of a tractor trailer sustaining both knee and lumbar injuries. He is [facility] for this [facility] since he as well as the Workmen's Comp. physicians and once again is on disability. He is a very nice patient of Dr. Nellie Duran and is being seen today in kind consultation from Dr. Alessio Harden for evaluation of progressively worsening right shoulder pain. 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 Over the last has been much more consistent and has been nearly constant for the past 6 months. He does take episodic Advil and Tylenol and is progressed to the point where now he is having fairly global pain into the shoulder with a constant ache but sharper pain and 7-8 out of 10 with active elevation and does have pain associated weakness currently. He recently contacted Dr. Alessio Harden who recommended today's orthopedic and sports consultation. He does take occasional Advil or Aleve and can have discomfort at night particular if he rolls over onto his right shoulder. Does have crepitation and popping and considerable weakness. He has not had recent imaging and while he does have a history of disc protrusions in the neck is not complaining of high-grade radicular symptoms. He is being seen today for orthopedic and sports consultation with initial imaging.       Pain Assessment  Location of Pain: Shoulder  Location Modifiers: Right  Severity of Pain: 4  Quality of Pain: Dull, Aching  Duration of Pain: Persistent  Frequency of Pain: Number of Occurrences:   1     Standing Expiration Date:   9/28/2022    MRI SHOULDER RIGHT WO CONTRAST     Standing Status:   Future     Standing Expiration Date:   10/28/2021     Scheduling Instructions:      [facility] [address] Prabhakar Crowder       [phone]            AUTH #:      TIME AND DATE TBD      PLEASE CALL PATIENT ONCE APPROVED TO SCHEDULE       PUSH TO [employer]            Remember that it may take several business days to pre-cert your MRI through your insurance. Our office will contact you as soon as we have the approval. We will not give any test results over the phone. Please call [phone] once you have your test day and time to schedule a follow up with Dr. Aidan Guzman. Order Specific Question:   Reason for exam:     Answer:   R/O ROTATOR CUFF TEAR, EVALUATE OA, LABRAL TEAR, IMPINGEMENT       Treatment Plan: Treatment options were discussed with Christophe today. We did review his plain films and exam findings. He has had longstanding pain in his shoulder but has become much more consistent and painful in the last 6 to 12 months. He does appear to have underlying substantial glenohumeral degenerative changes. I would like for him to have an MRI to more thoroughly evaluate degree of his degenerative changes and we did place him on a Medrol Dosepak followed by diclofenac. Hopefully get his MRI done expeditiously so I can see him back in the office. And activity modification was discussed. We will see him back post imaging but he will contact us with questions or concerns. He is currently not working. Cc: Dr. Marilee Sanderson      This dictation was performed with a verbal recognition program [facility]) and it was checked for errors. It is possible that there are still dictated errors within this office note. If so, please bring any errors to my attention for an addendum.  All efforts were made to ensure that this office note is accurate.

## 2021-09-28 NOTE — PATIENT INSTRUCTIONS
If you're currently taking an anti-inflammatory such as advil, aleve, ibuprofen, diclofenac, naproxen, meloxicam, celebrex, or nabumetone, please stop. Take Medrol first for 6 days. This is a steroid pack. Flip the package over to the foil side and the directions will tell you to start with 6 pills the first day, 5 pills the second day, etc. Please do not take any other anti-inflammatories with the medrol dose charmaine as this can upset your stomach. If something else is needed, you may take extra strength tylenol.      Once you are finished with the medrol, then you may re-start or start your anti-inflammatory: DICLOFENAC

## 2021-09-29 ENCOUNTER — TELEPHONE (OUTPATIENT)
Dept: ORTHOPEDIC SURGERY | Age: 51
End: 2021-09-29

## 2021-09-29 NOTE — TELEPHONE ENCOUNTER
Spoke to patient and informed them that their MRI has been authorized and that they can call and schedule scan at their convenience. Also told them that they can call and schedule a f/u with Dr. Daniela Burns once they have MRI scheduled, leaving at least 2-3 days for our office to receive their results.

## 2021-10-04 ENCOUNTER — TELEPHONE (OUTPATIENT)
Dept: ORTHOPEDIC SURGERY | Age: 51
End: 2021-10-04

## 2021-10-04 DIAGNOSIS — Z86.59 HISTORY OF CLAUSTROPHOBIA: Primary | ICD-10-CM

## 2021-10-04 RX ORDER — DIAZEPAM 10 MG/1
TABLET ORAL
Qty: 1 TABLET | Refills: 0 | Status: SHIPPED | OUTPATIENT
Start: 2021-10-04 | End: 2021-11-04

## 2021-10-04 NOTE — TELEPHONE ENCOUNTER
LVM FOR PATIENT THAT DR. DELGADO IS SENDING IN RX FOR VALIUM FOR PATIENT TO TAKE 1 HOURS PRIOR TO MRI SCAN.  CAN CALL WITH ANY QUESTIONS

## 2021-10-19 ENCOUNTER — OFFICE VISIT (OUTPATIENT)
Dept: ORTHOPEDIC SURGERY | Age: 51
End: 2021-10-19
Payer: COMMERCIAL

## 2021-10-19 DIAGNOSIS — M75.41 IMPINGEMENT SYNDROME OF RIGHT SHOULDER: ICD-10-CM

## 2021-10-19 DIAGNOSIS — M25.511 ACUTE PAIN OF RIGHT SHOULDER: Primary | ICD-10-CM

## 2021-10-19 DIAGNOSIS — M19.011 LOCALIZED OSTEOARTHRITIS OF RIGHT SHOULDER: ICD-10-CM

## 2021-10-19 PROCEDURE — 1036F TOBACCO NON-USER: CPT | Performed by: FAMILY MEDICINE

## 2021-10-19 PROCEDURE — G8484 FLU IMMUNIZE NO ADMIN: HCPCS | Performed by: FAMILY MEDICINE

## 2021-10-19 PROCEDURE — 20610 DRAIN/INJ JOINT/BURSA W/O US: CPT | Performed by: FAMILY MEDICINE

## 2021-10-19 PROCEDURE — G8417 CALC BMI ABV UP PARAM F/U: HCPCS | Performed by: FAMILY MEDICINE

## 2021-10-19 PROCEDURE — 99213 OFFICE O/P EST LOW 20 MIN: CPT | Performed by: FAMILY MEDICINE

## 2021-10-19 PROCEDURE — G8427 DOCREV CUR MEDS BY ELIG CLIN: HCPCS | Performed by: FAMILY MEDICINE

## 2021-10-19 RX ORDER — LIDOCAINE HYDROCHLORIDE 10 MG/ML
3 INJECTION, SOLUTION INFILTRATION; PERINEURAL ONCE
Status: COMPLETED | OUTPATIENT
Start: 2021-10-19 | End: 2021-10-19

## 2021-10-19 RX ORDER — BUPIVACAINE HYDROCHLORIDE 2.5 MG/ML
4 INJECTION, SOLUTION INFILTRATION; PERINEURAL ONCE
Status: COMPLETED | OUTPATIENT
Start: 2021-10-19 | End: 2021-10-19

## 2021-10-19 RX ORDER — BETAMETHASONE SODIUM PHOSPHATE AND BETAMETHASONE ACETATE 3; 3 MG/ML; MG/ML
12 INJECTION, SUSPENSION INTRA-ARTICULAR; INTRALESIONAL; INTRAMUSCULAR; SOFT TISSUE ONCE
Status: COMPLETED | OUTPATIENT
Start: 2021-10-19 | End: 2021-10-19

## 2021-10-19 RX ADMIN — BUPIVACAINE HYDROCHLORIDE 10 MG: 2.5 INJECTION, SOLUTION INFILTRATION; PERINEURAL at 14:31

## 2021-10-19 RX ADMIN — BETAMETHASONE SODIUM PHOSPHATE AND BETAMETHASONE ACETATE 12 MG: 3; 3 INJECTION, SUSPENSION INTRA-ARTICULAR; INTRALESIONAL; INTRAMUSCULAR; SOFT TISSUE at 14:31

## 2021-10-19 RX ADMIN — LIDOCAINE HYDROCHLORIDE 3 ML: 10 INJECTION, SOLUTION INFILTRATION; PERINEURAL at 14:32

## 2021-10-19 NOTE — PROGRESS NOTES
Chief Complaint    Shoulder Pain (TR MRI R SHOULDER)    FU acute on chronic right shoulder pain with weakness and motion loss with underlying glenohumeral arthropathy and suspected impingement. Review of right shoulder imaging    History of Present Illness:  Akhil Curtis is a 53 y.o. male is a very pleasant right-hand-dominant white male who is currently on disability and is now several years out from a work-related injury in which he fell out of a tractor trailer sustaining both knee and lumbar injuries. He is [facility] for [facility] since he as well as the Workmen's [employer]. physicians and once again is on disability. He is a very nice patient of Dr. Joseph Young and is being seen today in kind consultation from Dr. Katy Silver for evaluation of progressively worsening right shoulder pain. 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 Over the last has been much more consistent and has been nearly constant for the past 6 months. He does take episodic Advil and Tylenol and is progressed to the point where now he is having fairly global pain into the shoulder with a constant ache but sharper pain and 7-8 out of 10 with active elevation and does have pain associated weakness currently. He recently contacted Dr. Katy Silver who recommended today's orthopedic and sports consultation. He does take occasional Advil or Aleve and can have discomfort at night particular if he rolls over onto his right shoulder. Does have crepitation and popping and considerable weakness. He has not had recent imaging and while he does have a history of disc protrusions in the neck is not complaining of high-grade radicular symptoms. He is being seen today for orthopedic and sports consultation with initial imaging.     Ana Mcclain was initially evaluated in the office on 9/28/2021 and was found to have arthropathy to the right glenohumeral joint with prominent weakness and motion loss and clinical impingement we did send him for an MRI. His MRI was obtained at [facility] on 10/13/2021 and did show evidence of severe glenohumeral arthritis with spurring and chondromalacia with capsulosynovitis with chronic labral tearing and scarring. He did have peritendinitis and cuff tendinopathy but no obvious full-thickness rotator cuff tear identified. He did have a type II acromium AC hypertrophy and degenerative changes resulting in outlet related cuff impingement. Clinically still feeling better compared to his original evaluation and had a very prominent 50% improvement while on the Medrol pack which is gradually worsened once again to about a 25% improvement. We had held off therapy pending today's evaluation but he has been compliant with his diclofenac. Denies locking or catching. Medical History  Patient's medications, allergies, past medical, surgical, social and family histories were reviewed and updated as appropriate. Review of Systems  Relevant review of systems reviewed on 9/28/2021 and available in the patient's chart under the medial tab. Vital Signs  There were no vitals filed for this visit. General Exam:   Constitutional: Patient is adequately groomed with no evidence of malnutrition  DTRs: Deep tendon reflexes are intact  Mental Status: The patient is oriented to time, place and person. The patient's mood and affect are appropriate. Lymphatic: The lymphatic examination bilaterally reveals all areas to be without enlargement or induration. Vascular: Examination reveals no swelling or calf tenderness. Peripheral pulses are palpable and 2+. Neurological: The patient has good coordination. There is no weakness or sensory deficit. Shoulder Examination    Inspection: There is no high-grade deformity although he may have some atrophy. He does have some AC and also glenohumeral crepitation. Palpation: He does have continued tenderness over the biceps tendon greater tuberosity more mildly over the [facility] joint and moderately over the posterior cuff. Rang of Motion: Marked diminished range of motion about the shoulder. He can only abduct to about 80 degrees forward flexion about 85-90. External rotation is limited to 25 with internal rotation is just less than hip pocket. Strength: Pain associated weakness at 4 out of 5 with supra and infraspinatus testing. Subscap better at 4+ out of 5. Special Tests: He appears to have a negative drop test.  Pain associated cuff weakness as noted. Glenohumeral grind test and impingement testing are s moderately positive. Did not sense high-grade instability does appear to have glenohumeral crepitation. Some discomfort with axial load testing but I will call her Spurling's currently negative. Skin: There are no rashes, ulcerations or lesions. Distal motor sensory and vascular exam is intact. Gait: Mild altalgia. Reflexes:  Symmetrically preserved. Additional Comments:        Additional Examinations:  Contralateral Exam: Examination of the left shoulder reveals no atrophy or deformity. The skin is warm and dry. Range of motion is within normal limits. There is no focal tenderness with palpation. No AC joint tenderness. Negative Neer's and Hand-Hemant exams. Strength is graded 5/5 throughout. Right Upper Extremity:  Examination of the right upper extremity does not show any tenderness, deformity or injury. Range of motion is unremarkable. There is no gross instability. There are no rashes, ulcerations or lesions. Strength and tone are normal.  Left Upper Extremity: Examination of the left upper extremity does not show any tenderness, deformity or injury. Range of motion is unremarkable. There is no gross instability. There are no rashes, ulcerations or lesions.   Strength and tone are normal.         Diagnostic Test Findings:   Right shoulder MRI obtained at [facility] 10/13/2021 as listed above    Assessment: #1. Worsening chronic right shoulder pain with right documented severe right glenohumeral arthropathy and cuff tendinopathy with peritendinobursitis AC arthropathy with outlet impingement        Impression:    Encounter Diagnoses   Name Primary?  Acute pain of right shoulder Yes    Localized osteoarthritis of right shoulder     Impingement syndrome of right shoulder        Office Procedures:     Orders Placed This Encounter   Procedures    [facility] Physical Therapy     Referral Priority:   Routine     Referral Type:   Eval and Treat     Referral Reason:   Specialty Services Required     Requested Specialty:   Physical Therapy     Number of Visits Requested:   1    [id number] - MD DRAIN/INJECT LARGE JOINT/BURSA       Treatment Plan: Treatment options were discussed with Christophe today. We did again review his plain films, recent right shoulder MRI and exam findings. He has had longstanding pain in his shoulder but has become much more consistent and painful in the last 6 to 12 months. He does appear to have underlying severe glenohumeral degenerative changes with cuff tendinopathy peritendinobursitis and outlet impingement. We did discuss in detail both operative and nonoperative approaches and I suspect he will likely down the road need shoulder replacement. After discussing options, did perform a right shoulder glenohumeral injection today using 2 cc of Celestone, 4 cc Marcaine, 3 cc of Xylocaine. We will continue with his diclofenac 75 mg 1 pill twice daily and will start him in physical therapy. We will see him back in 4 to 6 weeks and should he have ongoing for it, we will have him sit down with one of our shoulder arthroplasty physicians. Icing and activity modification the importance of performing some exercise program was discussed.   He will contact us in the interim with questions or concerns. He is not currently working. Cc: Dr. Bridgette Castaneda      This dictation was performed with a verbal recognition program [facility]) and it was checked for errors. It is possible that there are still dictated errors within this office note. If so, please bring any errors to my attention for an addendum. All efforts were made to ensure that this office note is accurate.

## 2021-12-14 ENCOUNTER — OFFICE VISIT (OUTPATIENT)
Dept: ORTHOPEDIC SURGERY | Age: 51
End: 2021-12-14
Payer: COMMERCIAL

## 2021-12-14 DIAGNOSIS — M19.011 LOCALIZED OSTEOARTHRITIS OF RIGHT SHOULDER: Primary | ICD-10-CM

## 2021-12-14 DIAGNOSIS — M25.511 ACUTE PAIN OF RIGHT SHOULDER: ICD-10-CM

## 2021-12-14 DIAGNOSIS — M67.911 TENDINOPATHY OF RIGHT ROTATOR CUFF: ICD-10-CM

## 2021-12-14 DIAGNOSIS — M75.41 IMPINGEMENT SYNDROME OF RIGHT SHOULDER: ICD-10-CM

## 2021-12-14 PROCEDURE — 99213 OFFICE O/P EST LOW 20 MIN: CPT | Performed by: FAMILY MEDICINE

## 2021-12-14 PROCEDURE — 1036F TOBACCO NON-USER: CPT | Performed by: FAMILY MEDICINE

## 2021-12-14 PROCEDURE — 3017F COLORECTAL CA SCREEN DOC REV: CPT | Performed by: FAMILY MEDICINE

## 2021-12-14 PROCEDURE — G8417 CALC BMI ABV UP PARAM F/U: HCPCS | Performed by: FAMILY MEDICINE

## 2021-12-14 PROCEDURE — G8427 DOCREV CUR MEDS BY ELIG CLIN: HCPCS | Performed by: FAMILY MEDICINE

## 2021-12-14 PROCEDURE — G8484 FLU IMMUNIZE NO ADMIN: HCPCS | Performed by: FAMILY MEDICINE

## 2021-12-14 RX ORDER — METHYLPREDNISOLONE 4 MG/1
TABLET ORAL
Qty: 21 KIT | Refills: 0 | Status: SHIPPED | OUTPATIENT
Start: 2021-12-14 | End: 2021-12-27

## 2021-12-14 NOTE — PROGRESS NOTES
Chief Complaint    Shoulder Pain (FU R SHOULDER)    FU acute on chronic right shoulder pain with weakness and motion loss with underlying glenohumeral arthropathy and suspected impingement. Review of right shoulder imaging    History of Present Illness:  Cameron Choi is a 49 y.o. male is a very pleasant right-hand-dominant white male who is currently on disability and is now several years out from a work-related injury in which he fell out of a tractor trailer sustaining both knee and lumbar injuries. He is seen multiple physicians for this from [facility] since he as well as the Workmen's Comp. physicians and once again is on disability. He is a very nice patient of Dr. Travon Quinn and is being seen today in kind consultation from Dr. Monse Leon for evaluation of progressively worsening right shoulder pain. 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 Over the last has been much more consistent and has been nearly constant for the past 6 months. He does take episodic Advil and Tylenol and is progressed to the point where now he is having fairly global pain into the shoulder with a constant ache but sharper pain and 7-8 out of 10 with active elevation and does have pain associated weakness currently. He recently contacted Dr. Monse Leon who recommended today's orthopedic and sports consultation. He does take occasional Advil or Aleve and can have discomfort at night particular if he rolls over onto his right shoulder. Does have crepitation and popping and considerable weakness. He has not had recent imaging and while he does have a history of disc protrusions in the neck is not complaining of high-grade radicular symptoms. He is being seen today for orthopedic and sports consultation with initial imaging.     Alok Lambert was initially evaluated in the office on 9/28/2021 and was found to have arthropathy to the right glenohumeral joint with prominent weakness and motion loss and clinical impingement we did send him for an MRI. His MRI was obtained at [facility] on 10/13/2021 and did show evidence of severe glenohumeral arthritis with spurring and chondromalacia with capsulosynovitis with chronic labral tearing and scarring. He did have peritendinitis and cuff tendinopathy but no obvious full-thickness rotator cuff tear identified. He did have a type II acromium AC hypertrophy and degenerative changes resulting in outlet related cuff impingement. Clinically still feeling better compared to his original evaluation and had a very prominent 50% improvement while on the Medrol pack which is gradually worsened once again to about a 25% improvement. We had held off therapy pending today's evaluation but he has been compliant with his diclofenac. Denies locking or catching. We last saw Michaela Pandya in the office on 10/19/2021 was continued on conservative treatment for his MRI documented severe right shoulder glenohumeral arthropathy with chronic labral tearing with cuff tendinopathy. Once again he did not have any evidence of full-thickness rotator cuff tearing and with conservative treatment he really has not progressed substantially. He did get temporary improvement following his glenohumeral injection and has been [place] with a friend who is a physical therapist.  He has been improving to some degree with his motion and strength but continues to have substantial difficulty sleeping which is affecting his quality of life. He has tolerated his anti-inflammatories. Medical History  Patient's medications, allergies, past medical, surgical, social and family histories were reviewed and updated as appropriate. Review of Systems  Relevant review of systems reviewed on 9/28/2021 and available in the patient's chart under the medial tab. Vital Signs  There were no vitals filed for this visit.       General Exam: Constitutional: Patient is adequately groomed with no evidence of malnutrition  DTRs: Deep tendon reflexes are intact  Mental Status: The patient is oriented to time, place and person. The patient's mood and affect are appropriate. Lymphatic: The lymphatic examination bilaterally reveals all areas to be without enlargement or induration. Vascular: Examination reveals no swelling or calf tenderness. Peripheral pulses are palpable and 2+. Neurological: The patient has good coordination. There is no weakness or sensory deficit. Shoulder Examination    Inspection: There is no high-grade deformity although he may have some atrophy. He does have some AC and also glenohumeral crepitation. Palpation: He does have continued tenderness over the biceps tendon greater tuberosity more mildly over the [facility] joint and moderately over the posterior cuff. Rang of Motion: Marked diminished range of motion about the shoulder. He can only abduct to about 80 degrees forward flexion about 85-90. External rotation is limited to 25 with internal rotation is just less than hip pocket. Strength: Pain associated weakness at 4 out of 5 with supra and infraspinatus testing. Subscap better at 4+ out of 5. Special Tests: He appears to have a negative drop test.  Pain associated cuff weakness as noted. Glenohumeral grind test and impingement testing are s moderately positive. Did not sense high-grade instability does appear to have glenohumeral crepitation. Some discomfort with axial load testing but I will call her Spurling's currently negative. Skin: There are no rashes, ulcerations or lesions. Distal motor sensory and vascular exam is intact. Gait: Mild altalgia. Reflexes:  Symmetrically preserved. Additional Comments:        Additional Examinations:  Contralateral Exam: Examination of the left shoulder reveals no atrophy or deformity. The skin is warm and dry.   Range of motion is within normal limits. There is no focal tenderness with palpation. No AC joint tenderness. Negative Neer's and Hand-Hemant exams. Strength is graded 5/5 throughout. Right Upper Extremity:  Examination of the right upper extremity does not show any tenderness, deformity or injury. Range of motion is unremarkable. There is no gross instability. There are no rashes, ulcerations or lesions. Strength and tone are normal.  Left Upper Extremity: Examination of the left upper extremity does not show any tenderness, deformity or injury. Range of motion is unremarkable. There is no gross instability. There are no rashes, ulcerations or lesions. Strength and tone are normal.         Diagnostic Test Findings:   Right shoulder MRI obtained at [facility] 10/13/2021 as listed above  Narrative   Site: [facility] Rad #: [id number] #: [id number] Procedure: MR Right Shoulder joint w/o Contrast ; Reason for Exam: rule out rotator cuff tear; evaluate OA; labral tear, impingement; acute pain of right shoulder; localized osteoarthritis;    tendinopathy of right rotator cuff   This document is confidential medical information.  Unauthorized disclosure or use of this information is prohibited by law. If you are not the intended recipient of this document, please advise us by calling immediately [phone].       [facility] - [address] [place] [address]           Patient Name: Jaquelin La   Case ID: [id number]   Patient : 1970   Referring Physician: Aureliano Simms MD   Exam Date: 10/13/2021   Exam Description: MR Right Shoulder joint w/o Contrast            HISTORY:  Evaluate for rotator cuff tear; evaluate osteoarthritis; labral tear, impingement.     Acute pain of right shoulder.  Localized osteoarthritis.  Tendinopathy of right rotator cuff.       TECHNICAL FACTORS:  Long- and short-axis fat- and water-weighted images were performed.       COMPARISON:  None.     FINDINGS:  Glenoid remodeling.  Subchondral cyst, high-grade chondromalacia and posterior and    superior labral tearing present.       Chondral irregularity involves the superomedial humeral head and glenoid.       Arcuate segment biceps long head is tendinopathic.       Cuff tendinopathy and peritendinitis.  No cuff tear.       Prominent fat between the infraspinatus and supraspinatus likely is a variant.       CONCLUSION:   1. Severe glenohumeral arthrosis includes remodeling, high-grade chondromalacia, spurring and    capsulosynovitis.  Labrum is torn and scarred. 2. Cuff tendinopathy and peritendinitis.  No cuff tear. 3. Outlet-related cuff impingement [facility] joint hypertrophy and a lateral downsloping    hypertrophied type 2 acromion.  AC arthrosis present. 4. No osseous fracture, AVN or mass.  Prominent red marrow noted as a variant.           Thank you for the opportunity to provide your interpretation.               Keegan Rosales MD       A: GM/dt 10/13/2021 6:13 PM   T: DT 10/13/2021 5:46 PM       Assessment: #1. Worsening chronic right shoulder pain with right documented severe right glenohumeral arthropathy and cuff tendinopathy with peritendinobursitis AC arthropathy with outlet impingement        Impression:    Encounter Diagnoses   Name Primary?  Localized osteoarthritis of right shoulder Yes    Impingement syndrome of right shoulder     Acute pain of right shoulder     Tendinopathy of right rotator cuff        Office Procedures:     No orders of the defined types were placed in this encounter. Treatment Plan: Treatment options were discussed with Christophe today. We did again review his plain films, recent right shoulder MRI and exam findings. He has had longstanding pain in his shoulder but has become much more consistent and painful in the last 6 to 12 months.   He does appear to have underlying severe glenohumeral degenerative changes with cuff tendinopathy

## 2021-12-27 PROBLEM — I10 ESSENTIAL HYPERTENSION: Status: ACTIVE | Noted: 2021-12-27

## 2021-12-27 PROBLEM — Z87.39 HISTORY OF GOUT: Status: ACTIVE | Noted: 2021-12-27

## 2021-12-27 ASSESSMENT — ENCOUNTER SYMPTOMS: SHORTNESS OF BREATH: 0

## 2021-12-28 NOTE — PROGRESS NOTES
Subjective:      Patient ID: Edith Gonzalez is a 49 y.o. male. HPI  NEW PATIENT VISIT / PREOPERATIVE PHYSICAL:    Patient was sent by Dr. Jame Lee for preoperative clearance to have Right Shoulder Replacement Surgery on 1-13-22 at [facility]. Patient is here today to become established. He is feeling well and had labs done on 5-7-21. He takes Norvasc 5 mg and Losartan 100 mg daily for Hypertension. He takes Allopurinol 300 mg daily and Indocin 50 mg TID prn for gout. He denies any recent gout attacks. His Uric Acid level was 5.7 on 5-7-21. Allergies   Allergen Reactions    Dilaudid [Hydromorphone Hcl] Itching     Current Outpatient Medications   Medication Sig Dispense Refill    amLODIPine (NORVASC) 10 MG tablet TAKE ONE TABLET BY MOUTH DAILY 30 tablet 0    allopurinol (ZYLOPRIM) 300 MG tablet TAKE ONE TABLET BY MOUTH DAILY 90 tablet 3    losartan (COZAAR) 100 MG tablet TAKE ONE TABLET BY MOUTH DAILY 90 tablet 3    indomethacin (INDOCIN) 50 MG capsule TAKE ONE CAPSULE BY MOUTH THREE TIMES A DAY prn for acute gout flare 90 capsule 0     No current facility-administered medications for this visit.      Past Medical History:   Diagnosis Date    Essential hypertension     History of gout     History of kidney stones     Non morbid obesity due to excess calories      Past Surgical History:   Procedure Laterality Date    KIDNEY STONE SURGERY      Stent placed for stone    KNEE ARTHROSCOPY Right 2008    KNEE ARTHROSCOPY Right 2018    WISDOM TOOTH EXTRACTION       Social History     Tobacco Use    Smoking status: Never Smoker    Smokeless tobacco: Never Used   Vaping Use    Vaping Use: Never used   Substance Use Topics    Alcohol use: Yes     Comment: Occasional    Drug use: No     Family History   Problem Relation Age of Onset    Hypertension Mother     Hypertension Father     Heart Disease Father         MI    Alcohol Abuse Father     Hypertension Maternal Grandmother     Heart Disease Maternal Grandfather         MI    Hypertension Maternal Grandfather     Hypertension Paternal Grandmother     Hypertension Paternal Grandfather     Asthma Son         Review of Systems   Constitutional: Negative for chills and fever. HENT: Negative for congestion, rhinorrhea and sore throat. Respiratory: Negative for cough, shortness of breath and wheezing. Cardiovascular: Positive for leg swelling. Negative for chest pain and palpitations. Gastrointestinal: Negative for abdominal pain, blood in stool, constipation, diarrhea, nausea and vomiting. Genitourinary: Negative for dysuria, frequency, hematuria and urgency. Musculoskeletal: Positive for arthralgias. Psychiatric/Behavioral: Negative for dysphoric mood and suicidal ideas. BP Readings from Last 3 Encounters:   12/29/21 (!) 138/92   05/07/21 132/82   01/04/21 136/87       /86   Pulse 78   Temp 98.1 °F (36.7 °C)   Ht 5' 11\" (1.803 m)   Wt 287 lb 3.2 oz (130.3 kg)   SpO2 98%   BMI 40.06 kg/m²    BP (!) 138/92   Pulse 78   Temp 98.1 °F (36.7 °C)   Ht 5' 11\" (1.803 m)   Wt 287 lb 3.2 oz (130.3 kg)   SpO2 98%   BMI 40.06 kg/m²    Objective:   Physical Exam  Vitals reviewed. Constitutional:       General: He is not in acute distress. Appearance: He is well-developed. HENT:      Head: Normocephalic. Right Ear: External ear normal.      Left Ear: External ear normal.   Neck:      Thyroid: No thyromegaly. Vascular: No carotid bruit or JVD. Cardiovascular:      Rate and Rhythm: Normal rate and regular rhythm. Heart sounds: Normal heart sounds. No murmur heard. Pulmonary:      Effort: Pulmonary effort is normal.      Breath sounds: Normal breath sounds. No wheezing or rales. Abdominal:      General: Bowel sounds are normal. There is no distension. Palpations: Abdomen is soft. There is no mass. Tenderness: There is no abdominal tenderness.  There is no guarding or rebound. Hernia: No hernia is present. Comments: Obese. Lymphadenopathy:      Cervical: No cervical adenopathy. Neurological:      Mental Status: He is alert and oriented to person, place, and time. Assessment:       Preoperative Physical  Primary Osteoarthritis Right Shoulder   Hypertension  Gout      Plan:      Protime, PTT, Chem 12, HgbA1C, CBC, Transferrin, Type and Screen, UA, Urine Culture  EKG: Normal Sinus Ryhthm with no acute changes. Patient is cleared for surgery pending labs  Hold Indocin 1 week prior to surgery  Flu Shot Declined   Shingrix Shot Declined   Tdap Shot was within the past 10 years per patient. He will call back with the date. Referral given for Colonoscopy   RTO 6 months for Hypertension / Gout     Addendum 1-3-22:  Labs were reviewed and patient is cleared for surgery.       Results faxed to: [facility]

## 2021-12-29 ENCOUNTER — TELEPHONE (OUTPATIENT)
Dept: FAMILY MEDICINE CLINIC | Age: 51
End: 2021-12-29

## 2021-12-29 ENCOUNTER — OFFICE VISIT (OUTPATIENT)
Dept: FAMILY MEDICINE CLINIC | Age: 51
End: 2021-12-29
Payer: COMMERCIAL

## 2021-12-29 VITALS
SYSTOLIC BLOOD PRESSURE: 136 MMHG | OXYGEN SATURATION: 98 % | WEIGHT: 287.2 LBS | TEMPERATURE: 98.1 F | DIASTOLIC BLOOD PRESSURE: 86 MMHG | HEIGHT: 71 IN | HEART RATE: 78 BPM | BODY MASS INDEX: 40.21 KG/M2

## 2021-12-29 DIAGNOSIS — Z23 NEED FOR INFLUENZA VACCINATION: ICD-10-CM

## 2021-12-29 DIAGNOSIS — Z01.818 PRE-OP EXAM: Primary | ICD-10-CM

## 2021-12-29 DIAGNOSIS — M19.011 PRIMARY OSTEOARTHRITIS, RIGHT SHOULDER: ICD-10-CM

## 2021-12-29 DIAGNOSIS — Z23 NEED FOR TDAP VACCINATION: ICD-10-CM

## 2021-12-29 DIAGNOSIS — Z87.39 HISTORY OF GOUT: ICD-10-CM

## 2021-12-29 DIAGNOSIS — Z00.00 PREVENTATIVE HEALTH CARE: ICD-10-CM

## 2021-12-29 DIAGNOSIS — I10 ESSENTIAL HYPERTENSION: ICD-10-CM

## 2021-12-29 DIAGNOSIS — Z23 NEED FOR SHINGLES VACCINE: ICD-10-CM

## 2021-12-29 PROBLEM — Z87.442 HISTORY OF KIDNEY STONES: Status: ACTIVE | Noted: 2021-12-29

## 2021-12-29 PROBLEM — E66.09 NON MORBID OBESITY DUE TO EXCESS CALORIES: Status: ACTIVE | Noted: 2021-12-29

## 2021-12-29 LAB
A/G RATIO: 1.4 (ref 1.1–2.2)
ALBUMIN SERPL-MCNC: 4.2 G/DL (ref 3.4–5)
ALP BLD-CCNC: 186 U/L (ref 40–129)
ALT SERPL-CCNC: 39 U/L (ref 10–40)
ANION GAP SERPL CALCULATED.3IONS-SCNC: 13 MMOL/L (ref 3–16)
APTT: 38 SEC (ref 26.2–38.6)
AST SERPL-CCNC: 25 U/L (ref 15–37)
BILIRUB SERPL-MCNC: 0.8 MG/DL (ref 0–1)
BILIRUBIN, POC: NORMAL
BLOOD URINE, POC: NORMAL
BUN BLDV-MCNC: 12 MG/DL (ref 7–20)
CALCIUM SERPL-MCNC: 8.8 MG/DL (ref 8.3–10.6)
CHLORIDE BLD-SCNC: 108 MMOL/L (ref 99–110)
CLARITY, POC: CLEAR
CO2: 22 MMOL/L (ref 21–32)
COLOR, POC: YELLOW
CREAT SERPL-MCNC: 0.9 MG/DL (ref 0.9–1.3)
GFR AFRICAN AMERICAN: >60
GFR NON-AFRICAN AMERICAN: >60
GLUCOSE BLD-MCNC: 112 MG/DL (ref 70–99)
GLUCOSE URINE, POC: NORMAL
HCT VFR BLD CALC: 46 % (ref 40.5–52.5)
HEMOGLOBIN: 14.9 G/DL (ref 13.5–17.5)
INR BLD: 1.1 (ref 0.88–1.12)
KETONES, POC: NORMAL
LEUKOCYTE EST, POC: NORMAL
MCH RBC QN AUTO: 29.2 PG (ref 26–34)
MCHC RBC AUTO-ENTMCNC: 32.4 G/DL (ref 31–36)
MCV RBC AUTO: 90 FL (ref 80–100)
NITRITE, POC: NORMAL
PDW BLD-RTO: 14 % (ref 12.4–15.4)
PH, POC: 5.5
PLATELET # BLD: 296 K/UL (ref 135–450)
PMV BLD AUTO: 9 FL (ref 5–10.5)
POTASSIUM SERPL-SCNC: 4.3 MMOL/L (ref 3.5–5.1)
PROTEIN, POC: NORMAL
PROTHROMBIN TIME: 12.5 SEC (ref 9.9–12.7)
RBC # BLD: 5.11 M/UL (ref 4.2–5.9)
REASON FOR REJECTION: NORMAL
REJECTED TEST: NORMAL
SODIUM BLD-SCNC: 143 MMOL/L (ref 136–145)
SPECIFIC GRAVITY, POC: 1.03
TOTAL PROTEIN: 7.2 G/DL (ref 6.4–8.2)
TRANSFERRIN: 257 MG/DL (ref 200–360)
UROBILINOGEN, POC: 0.2
WBC # BLD: 7.7 K/UL (ref 4–11)

## 2021-12-29 PROCEDURE — G8417 CALC BMI ABV UP PARAM F/U: HCPCS | Performed by: FAMILY MEDICINE

## 2021-12-29 PROCEDURE — 99212 OFFICE O/P EST SF 10 MIN: CPT | Performed by: FAMILY MEDICINE

## 2021-12-29 PROCEDURE — G8427 DOCREV CUR MEDS BY ELIG CLIN: HCPCS | Performed by: FAMILY MEDICINE

## 2021-12-29 PROCEDURE — 36415 COLL VENOUS BLD VENIPUNCTURE: CPT | Performed by: FAMILY MEDICINE

## 2021-12-29 PROCEDURE — G8484 FLU IMMUNIZE NO ADMIN: HCPCS | Performed by: FAMILY MEDICINE

## 2021-12-29 PROCEDURE — 93000 ELECTROCARDIOGRAM COMPLETE: CPT | Performed by: FAMILY MEDICINE

## 2021-12-29 PROCEDURE — 81002 URINALYSIS NONAUTO W/O SCOPE: CPT | Performed by: FAMILY MEDICINE

## 2021-12-29 SDOH — ECONOMIC STABILITY: FOOD INSECURITY: WITHIN THE PAST 12 MONTHS, THE FOOD YOU BOUGHT JUST DIDN'T LAST AND YOU DIDN'T HAVE MONEY TO GET MORE.: NEVER TRUE

## 2021-12-29 SDOH — ECONOMIC STABILITY: FOOD INSECURITY: WITHIN THE PAST 12 MONTHS, YOU WORRIED THAT YOUR FOOD WOULD RUN OUT BEFORE YOU GOT MONEY TO BUY MORE.: NEVER TRUE

## 2021-12-29 ASSESSMENT — ENCOUNTER SYMPTOMS
DIARRHEA: 0
VOMITING: 0
RHINORRHEA: 0
COUGH: 0
BLOOD IN STOOL: 0
ABDOMINAL PAIN: 0
NAUSEA: 0
CONSTIPATION: 0
SORE THROAT: 0
WHEEZING: 0

## 2021-12-29 ASSESSMENT — SOCIAL DETERMINANTS OF HEALTH (SDOH): HOW HARD IS IT FOR YOU TO PAY FOR THE VERY BASICS LIKE FOOD, HOUSING, MEDICAL CARE, AND HEATING?: SOMEWHAT HARD

## 2021-12-29 NOTE — TELEPHONE ENCOUNTER
Anusha from [facility] states the Type & Screen preop lab was rejected due to being in the wrong tube. Please advise.

## 2021-12-30 DIAGNOSIS — R74.8 ELEVATED ALKALINE PHOSPHATASE LEVEL: Primary | ICD-10-CM

## 2021-12-30 DIAGNOSIS — Z01.810 PREOP CARDIOVASCULAR EXAM: Primary | ICD-10-CM

## 2021-12-30 LAB
ESTIMATED AVERAGE GLUCOSE: 114 MG/DL
HBA1C MFR BLD: 5.6 %
URINE CULTURE, ROUTINE: NORMAL

## 2022-01-03 ENCOUNTER — TELEPHONE (OUTPATIENT)
Dept: FAMILY MEDICINE CLINIC | Age: 52
End: 2022-01-03

## 2022-01-03 NOTE — TELEPHONE ENCOUNTER
I have done the addendum, printed the physical and labs, signed and faxed them to the number given. Please notify patient.

## 2022-01-03 NOTE — TELEPHONE ENCOUNTER
John Sauceda from [facility] called requesting an addended note that states pt is clear for surgery instead of \"cleared for surgery pending labs\" and the lab results, please fax to [phone].  Surgery is sched for 1/13/21

## 2022-01-04 ENCOUNTER — HOSPITAL ENCOUNTER (OUTPATIENT)
Dept: CT IMAGING | Age: 52
Discharge: HOME | End: 2022-01-04
Payer: COMMERCIAL

## 2022-01-04 DIAGNOSIS — M19.011 LOCALIZED OSTEOARTHRITIS OF RIGHT SHOULDER: ICD-10-CM

## 2022-01-04 PROCEDURE — 73200 CT UPPER EXTREMITY W/O DYE: CPT

## 2022-01-13 RX ORDER — GUAIFENESIN AND PSEUDOEPHEDRINE HCL 1200; 120 MG/1; MG/1
1 TABLET, EXTENDED RELEASE ORAL 2 TIMES DAILY PRN
Qty: 20 TABLET | Refills: 0 | Status: SHIPPED | OUTPATIENT
Start: 2022-01-13 | End: 2022-06-28

## 2022-01-13 RX ORDER — OSELTAMIVIR PHOSPHATE 75 MG/1
75 CAPSULE ORAL 2 TIMES DAILY
Qty: 10 CAPSULE | Refills: 0 | Status: SHIPPED | OUTPATIENT
Start: 2022-01-13 | End: 2022-01-18

## 2022-02-04 DIAGNOSIS — I10 ESSENTIAL HYPERTENSION: ICD-10-CM

## 2022-02-04 RX ORDER — AMLODIPINE BESYLATE 10 MG/1
10 TABLET ORAL DAILY
Qty: 30 TABLET | Refills: 4 | Status: SHIPPED | OUTPATIENT
Start: 2022-02-04 | End: 2022-04-27

## 2022-02-04 RX ORDER — LOSARTAN POTASSIUM 100 MG/1
TABLET ORAL
Qty: 90 TABLET | Refills: 1 | Status: SHIPPED | OUTPATIENT
Start: 2022-02-04 | End: 2022-10-20

## 2022-02-04 RX ORDER — ALLOPURINOL 300 MG/1
TABLET ORAL
Qty: 90 TABLET | Refills: 1 | Status: SHIPPED | OUTPATIENT
Start: 2022-02-04 | End: 2022-10-20 | Stop reason: SDUPTHER

## 2022-02-21 ENCOUNTER — HOSPITAL ENCOUNTER (OUTPATIENT)
Dept: PHYSICAL THERAPY | Age: 52
Setting detail: THERAPIES SERIES
Discharge: HOME | End: 2022-02-21
Payer: COMMERCIAL

## 2022-02-21 PROCEDURE — 97530 THERAPEUTIC ACTIVITIES: CPT | Performed by: PHYSICAL THERAPIST

## 2022-02-21 PROCEDURE — 97161 PT EVAL LOW COMPLEX 20 MIN: CPT | Performed by: PHYSICAL THERAPIST

## 2022-02-21 PROCEDURE — 97110 THERAPEUTIC EXERCISES: CPT | Performed by: PHYSICAL THERAPIST

## 2022-02-21 NOTE — PLAN OF CARE
[facility] [address], [address] [place] [address], [address] [place] [address]  Phone: [phone]   Fax: [phone]          Physical Therapy Certification    Dear Referring Practitioner: Jossue Houston MD,    We had the pleasure of evaluating the following patient for physical therapy services at [address] [place] [address]. A summary of our findings can be found in the initial assessment below. This includes our plan of care. If you have any questions or concerns regarding these findings, please do not hesitate to contact me at the office phone number checked above. Thank you for the referral.       Physician Signature:_______________________________Date:__________________  By signing above (or electronic signature), therapists plan is approved by physician      Patient: Rafi Vera   : 1970   MRN: [id number]  Referring Physician: Referring Practitioner: Jossue Houston MD      Evaluation Date: 2022      Medical Diagnosis Information:  Diagnosis: C41.949 (ICD-10-CM) - status post right shoulder total arthroplasty   Treatment Diagnosis: M25.511 (R) shoulder pain; M62.81 muscle weakness                                           Precautions/ Contra-indications: HTN, OA, Dizziness/vertigo- mostly at night when he gets up; disabled due to his back    C-SSRS Triggered by Intake questionnaire (Past 2 wk assessment):   [x] No, Questionnaire did not trigger screening.   [] Yes, Patient intake triggered further evaluation      [] C-SSRS Screening completed  [] PCP notified via Plan of Care  [] Emergency services notified     Latex Allergy:  [x]NO      []YES  Preferred Language for Healthcare:   [x]English       []other:    SUBJECTIVE: Patient stated complaint: Pt presents post R TSA done on 2/3/22. Pt did see surgeon last week and was told to start PT. Prior to surgery pt had pain in shoulder for years on/off.  His pain got a lot worse in the last year. He tried injections and PT prior to this surgery. He is R-handed. Pt still has some drainage from incision and also reports he had a low fever since surgery. Never above 100 deg F fever. The NP was not concerned about an infection. Pt is sleeping in a recliner right now. The NP said he can come out of sling at home but wear it when he is out. He was told he may start using his arm as tolerated. He says pain right after surgery was horrible but starting to get better. He is most sore in his R pec and bicep area. Relevant Medical History:HTN, OA, Dizziness/vertigo- mostly at night when he gets up; disabled due to his back  Functional Disability Index: UEFI - 21.25%   Relevant Medication:   Hydrocodone- as needed, ibuprofen  Current pain:  3/10  Pain at worst:  7/10  Pain at best:  3/10    Easing factors: icing,   Provocative factors: Pt limited in all ADLs with use of UE due to recent surgery and required use of sling post op. More pain at night. Type: [x]Constant   [x]Intermittent- increases  []Radiating [x]Localized []other:     Numbness/Tingling: he has numbness and tingling from elbow to hand- MDs office aware and said is normal post op. Neuropathy in LEs    Functional Limitations/Impairments: [x]Lifting/reaching [x]Grooming [x]Carrying    [x]ADL's [x]Driving [x]Sports/Recreations   []Other:    Occupation/School: He has presently on disability from a work-related accident. He has been on disability for 4 years. 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 Pt reports thoracic spine is encroaching on spinal cord. One level is compressing 50% of spinal cord. He has numbness/tingling and pain in B LEs.      Living Status/Prior Level of Function: Independent with ADLs and IADLs      OBJECTIVE:     CERV ROM     Cervical Flexion     Cervical Extension     Cervical SB Cervical rotation         ROM PROM AROM  Comment    L R L R    Flexion  ~80 flex forward bow; 70 table slide      Abduction        ER at side        ER at 90 abd        BB IR        IR at 90 abd        Other        Other             Strength- deferred due to recent surgery L R Comment   Flexion      Abduction      ER      IR      Supraspinatus      Upper Trap      Lower Trap      Mid Trap      Rhomboids      Biceps      Triceps      Horizontal Abduction      Horizontal Adduction      Lats        Orthopedic Special Tests: deferred  Special Tests Left Right   Apley Scratch IR:  ER:   Cross body: IR:  ER:  Cross Body:   Neer's     Full Can     Empty Can     Sharene Fan     Nerve Tension Testing     Speed's     Conti's      Spurling's     Repeated Scaption                Reflexes/Sensation (myotomes/dermatomes):  able to sense light touch in hand and arm    Joint mobility: n/t   []Normal    []Hypo   []Hyper    Palpation: n/t    Functional Mobility/Transfers: Indep    Posture: arrived in sling without abd pillow    Bandages/Dressings/Incisions: covered with Pernio type dressing. Open area that has been draining at superior incision. No redness or rash noted. MD/NP aware of drainage and said it was clear. Gait: (include devices/WB status) Indep                            Review Of Systems (ROS):  [x]Performed Review of systems (Integumentary, CardioPulmonary, Neurological) by intake and observation. Intake form has been scanned into medical record. Patient has been instructed to contact their primary care physician regarding ROS issues if not already being addressed at this time.       Co-morbidities/Complexities (which will affect course of rehabilitation):   []None           Arthritic conditions   []Rheumatoid arthritis (M05.9)  [x]Osteoarthritis (M19.91)   Cardiovascular conditions   [x]Hypertension (I10)  []Hyperlipidemia (E78.5)  []Angina pectoris (I20)  []Atherosclerosis (I70)   Musculoskeletal conditions   []Disc pathology   []Congenital spine pathologies   []Prior surgical intervention  []Osteoporosis (M81.8)  []Osteopenia (M85.8)   Endocrine conditions   []Hypothyroid (E03.9)  []Hyperthyroid Gastrointestinal conditions   []Constipation (A61.00)   Metabolic conditions   [x]Morbid obesity (E66.01)/overweight  []Diabetes type 1(E10.65) or 2 (E11.65)   [x]Neuropathy (G60.9)     Pulmonary conditions   []Asthma (J45)  []Coughing   []COPD (J44.9)   Psychological Disorders  []Anxiety (F41.9)  []Depression (F32.9)   []Other:   [x]Other:   On disability due to injury at work causing back pain and degenerative changes       Barriers to/and or personal factors that will affect rehab potential:              []Age  []Sex              []Motivation/Lack of Motivation                        [x]Co-Morbidities              []Cognitive Function, education/learning barriers              []Environmental, home barriers              []profession/work barriers  []past PT/medical experience  [x]other:On disability due to injury at work causing back pain and degenerative changes; his back pain may limit tolerance to positions and exercises as he progresses along in therapy. Justification:      Falls Risk Assessment (30 days):   [x] Falls Risk assessed and no intervention required. [] Falls Risk assessed and Patient requires intervention due to being higher risk   TUG score (>12s at risk):     [] Falls education provided, including         ASSESSMENT: Pt is a 49y.o. year old male s/p R TSA on 2/3/22. Pt presents with decreased ROM; decreased strength; increased pain; decreased flexibility; use of sling post op; and decreased functional mobility and ADLs.  Pt will benefit from skilled physical therapy services to address above limitations through strengthening, stretching, ROM, modalities as needed for pain control and swelling, instruction on HEP, education on post op restrictions and precautions, and education on return to functional mobility. Functional Impairments   []Noted spinal or UE joint hypomobility   []Noted spinal or UE joint hypermobility   [x]Decreased UE functional ROM   [x]Decreased UE functional strength   []Abnormal reflexes/sensation/myotomal/dermatomal deficits   [x]Decreased RC/scapular/core strength and neuromuscular control   []other:      Functional Activity Limitations (from functional questionnaire and intake)   [x]Reduced ability to tolerate prolonged functional positions   [x]Reduced ability or difficulty with changes of positions or transfers between positions   [x]Reduced ability to maintain good posture and demonstrate good body mechanics with sitting, bending, and lifting   [x] Reduced ability or tolerance with driving and/or computer work   [x]Reduced ability to sleep   [x]Reduced ability to perform lifting, reaching, carrying tasks   [x]Reduced ability to tolerate impact through UE   [x]Reduced ability to reach behind back   [x]Reduced ability to  or hold objects   [x]Reduced ability to throw or toss an object   []other:    Participation Restrictions   [x]Reduced participation in self care activities   [x]Reduced participation in home management activities   [x]Reduced participation in work activities   [x]Reduced participation in social activities. [x]Reduced participation in sport/recreation activities. Classification:   [x]Signs/symptoms consistent with post-surgical status including decreased ROM, strength and function.   []Signs/symptoms consistent with joint sprain/strain   []Signs/symptoms consistent with shoulder impingement   []Signs/symptoms consistent with shoulder/elbow/wrist tendinopathy   []Signs/symptoms consistent with Rotator cuff tear   []Signs/symptoms consistent with labral tear   []Signs/symptoms consistent with postural dysfunction    []Signs/symptoms consistent with Glenohumeral IR Deficit - <45 degrees   []Signs/symptoms consistent with facet dysfunction of cervical/thoracic spine    []Signs/symptoms consistent with pathology which may benefit from Dry needling     []other:     Prognosis/Rehab Potential:      []Excellent   [x]Good    []Fair   []Poor    Tolerance of evaluation/treatment:    []Excellent   [x]Good    []Fair   []Poor    PLAN:  Frequency/Duration:  1-2 days per week for 16 Weeks:  INTERVENTIONS:  [x] Therapeutic exercise including: strength training, ROM, for Upper extremity and core   [x]  NMR activation and proprioception for UE, scap and Core   [x] Manual therapy as indicated for shoulder, scapula and spine to include: Dry Needling/IASTM, STM, PROM, Gr I-IV mobilizations, manipulation. [x] Modalities as needed that may include: thermal agents, E-stim, Biofeedback, US, iontophoresis as indicated  [x] Patient education on joint protection, postural re-education, activity modification, progression of HEP. HEP instruction: Pt was instructed in, and safely and correctly demonstrated home exercise program. Patient verbalized understanding of proper frequency of exercises. Copy of exercises was scanned into patient chart and can be fount in the media file. GOALS:  Patient stated goal: improve shoulder movement and strength    Therapist goals for Patient:   Short Term Goals: To be achieved in: 2 weeks  1. Independent in HEP and progression per patient tolerance, in order to prevent re-injury. [] Progressing: [] Met: [] Not Met: [] Adjusted   2. Patient will have a decrease in pain to facilitate improvement in movement, function, and ADLs as indicated by Functional Deficits. [] Progressing: [] Met: [] Not Met: [] Adjusted    Long Term Goals: To be achieved in:  4-5 months  1. Disability index score of 80% or more for the UEFI to assist with reaching prior level of function. [] Progressing: [] Met: [] Not Met: [] Adjusted  2.  Patient will demonstrate increased R shoulder AROM to  140 flex, 140 abd, and ER at 80 to 90 degrees to allow for proper joint functioning as indicated by patients Functional Deficits. [] Progressing: [] Met: [] Not Met: [] Adjusted  3. Patient will demonstrate an increase in R shoulder strength to 4/5 flex, abd, ER in UE to allow for proper functional mobility as indicated by patients Functional Deficits. [] Progressing: [] Met: [] Not Met: [] Adjusted  4. Patient will return to ADLs above shoulder height without increased symptoms or restriction. [] Progressing: [] Met: [] Not Met: [] Adjusted  5. Pt will return to getting dressed and self care without pain or limitations in R shoulder. [] Progressing: [] Met: [] Not Met: [] Adjusted        Physical Therapy Evaluation Complexity Justification  [x] A history of present problem with:  [] no personal factors and/or comorbidities that impact the plan of care;  []1-2 personal factors and/or comorbidities that impact the plan of care  [x]3 personal factors and/or comorbidities that impact the plan of care  [x] An examination of body systems using standardized tests and measures addressing any of the following: body structures and functions (impairments), activity limitations, and/or participation restrictions;:  [] a total of 1-2 or more elements   [] a total of 3 or more elements   [x] a total of 4 or more elements   [x] A clinical presentation with:  [x] stable and/or uncomplicated characteristics   [] evolving clinical presentation with changing characteristics  [] unstable and unpredictable characteristics;   [x] Clinical decision making of [x] low, [] moderate, [] high complexity using standardized patient assessment instrument and/or measurable assessment of functional outcome.     [x] EVAL (LOW) 02848 (typically 20 minutes face-to-face)  [] EVAL (MOD) 14035 (typically 30 minutes face-to-face)  [] EVAL (HIGH) 90295 (typically 45 minutes face-to-face)  [] RE-EVAL [id number]      Electronically signed by:  Stanley Pate, PT, PT, DPT

## 2022-02-21 NOTE — FLOWSHEET NOTE
[address] [facility], [place]                                                         Physical Therapy Daily Treatment Note  Date:  2022    Patient Name:  Tarun Dunbar    :  1970  MRN: [id number]    Medical/Treatment Diagnosis Information:  · Diagnosis: L94.829 (ICD-10-CM) - status post right shoulder total arthroplasty  · Treatment Diagnosis: M25.511 (R) shoulder pain; M62.81 muscle weakness  Insurance/Certification information:  PT Insurance Information: [facility]- 20 visits, no auth  Physician Information:  Referring Practitioner: Cherie Turner MD  Has the plan of care been signed (Y/N):        []  Yes  [x]  No     Date of Patient follow up with Physician: around 3/18/22      Is this a Progress Report:     []  Yes  [x]  No        If Yes:  Date Range for reporting period:  Beginning- 2022   Ending    Progress report will be due (10 Rx or 30 days whichever is less): 10 visits or        Recertification will be due (POC Duration  / 90 days whichever is less): as above        Visit # Insurance Allowable Auth Required   1 20 []  Yes [x]  No        Functional Scale: UEFI - 21.25%        Date assessed:  2022      Latex Allergy:  [x]NO      []YES  Preferred Language for Healthcare:   [x]English       []other:      Pain level:  3-7/10  on 2022    SUBJECTIVE:  See eval    OBJECTIVE: See eval   Observation:    Test measurements:      CERV ROM       Cervical Flexion       Cervical Extension       Cervical SB       Cervical rotation                     ROM PROM AROM  Comment     L R L R     Flexion   ~80 flex forward bow; 70 table slide         Abduction             ER at side             ER at 90 abd             BB IR             IR at 90 abd             Other             Other                    Strength- deferred due to recent surgery L R Comment   Flexion         Abduction         ER         IR         Supraspinatus         Upper Trap         Lower Trap         Mid Trap         Rhomboids         Biceps         Triceps         Horizontal Abduction         Horizontal Adduction         Lats            Orthopedic Special Tests: deferred  Special Tests Left Right   Apley Scratch IR:  ER:   Cross body: IR:  ER:  Cross Body:   Neer's       Full Can       Empty Can       Yisel [place]       Nerve Tension Testing       Speed's       Conti's        Spurling's       Repeated Scaption                        Reflexes/Sensation (myotomes/dermatomes):  able to sense light touch in hand and arm     Joint mobility: n/t              []?Normal                       []?Hypo              []?Hyper     Palpation: n/t     Functional Mobility/Transfers: Indep     Posture: arrived in sling without abd pillow     Bandages/Dressings/Incisions: covered with Pernio type dressing. Open area that has been draining at superior incision. No redness or rash noted.  MD/NP aware of drainage and said it was clear.        Gait: (include devices/WB status) Indep          RESTRICTIONS/PRECAUTIONS: HTN, OA, Dizziness/vertigo- mostly at night when he gets up; disabled due to his back    Exercises/Interventions:     Exercise/Equipment Resistance/Repetitions Other comments   Aerobic Conditioning     Aerodyne          Stretching/PROM     Cane ER     Wand flex     Towel press up     Passive shoulder flex with opposite UE assist     Ball on wall for shoulder flex     Elbow PROM 10 x 10 secs    Side-lying flex     Table Slides 10 x 10 secs flex Limit to 90 degrees   Wall slides      UE [place]     Pulleys     Pendulum hold/forward bow 10 x 10 secs    BB IR     SL IR     Pec doorway stretch     CBA stretch     UT stretch     LS stretch     Isometrics     Retraction 3 x 10      X  50 blue     Weight shift     Flexion     Abduction     External Rotation     Internal Rotation     Biceps     Triceps          PRE's     Flexion     Abduction     External Rotation     Internal Rotation     Shrugs 3 x 10     EXT     Reverse Flys     Serratus     Horizontal Abd with ER     Biceps     Triceps     Retraction     Wrist flex/extn X 30 AROM    Cable Column/Theraband     External Rotation     Internal Rotation     Shrugs     Lats     Ext     Flex     Scapular Retraction     BIC     TRIC     PNF          Dynamic Stability          Plyoback            Patient education: Pt was educated on PT diagnosis, prognosis, and plan of care. Pt was educated on the use of ice throughout the day. Pt was educated on signs/symptoms of infection and to call with any questions or concerns. Pt educated on dressing, precautions and restrictions. Therapeutic Exercise and NMR EXR  [x] (49721) Provided verbal/tactile cueing for activities related to strengthening, flexibility, endurance, ROM  for improvements in scapular, scapulothoracic and UE control with self care, reaching, carrying, lifting, house/yardwork, driving/computer work.    [] (46129) Provided verbal/tactile cueing for activities related to improving balance, coordination, kinesthetic sense, posture, motor skill, proprioception  to assist with  scapular, scapulothoracic and UE control with self care, reaching, carrying, lifting, house/yardwork, driving/computer work. Therapeutic Activities:    [x] (97040 or 61608) Provided verbal/tactile cueing for activities related to improving balance, coordination, kinesthetic sense, posture, motor skill, proprioception and motor activation to allow for proper function of scapular, scapulothoracic and UE control with self care, carrying, lifting, driving/computer work.      Home Exercise Program:    [x] (83068) Reviewed/Progressed HEP activities related to strengthening, flexibility, endurance, ROM of scapular, scapulothoracic and UE control with self care, reaching, carrying, lifting, house/yardwork, driving/computer work  [] (33960) Reviewed/Progressed HEP activities related to improving balance, coordination, kinesthetic sense, posture, motor skill, proprioception of scapular, scapulothoracic and UE control with self care, reaching, carrying, lifting, house/yardwork, driving/computer work    Access Code: [email or web address]  URL: FAD [email or web address]  Date: 02/21/2022  Prepared by: Rosalie Luna    Exercises  Seated Shoulder Flexion Towel Slide at Table Top - 2 x daily - 7 x weekly - 10 reps - 1 sets - 10 hold  Forearm Strengthening with Ball Squeeze - 2 x daily - 7 x weekly - 3 sets - 10 reps  Seated Elbow Flexion AAROM - 2 x daily - 7 x weekly - 10 reps - 1 sets - 10 hold  Seated Scapular Retraction - 2 x daily - 7 x weekly - 3 sets - 10 reps  Seated Shoulder Shrugs - 2 x daily - 7 x weekly - 3 sets - 10 reps  Circular Shoulder Pendulum with Table Support - 2 x daily - 7 x weekly - 1 sets - 10 reps - 10 hold  Wrist Flexion Extension AROM - Palms Down - 1 x daily - 7 x weekly - 3 sets - 10 reps    Manual Treatments:  PROM / STM / Oscillations-Mobs:  G-I, II, III, IV (PA's, Inf., Post.)  [] (08650) Provided manual therapy to mobilize soft tissue/joints of cervical/CT, scapular GHJ and UE for the purpose of modulating pain, promoting relaxation,  increasing ROM, reducing/eliminating soft tissue swelling/inflammation/restriction, improving soft tissue extensibility and allowing for proper ROM for normal function with self care, reaching, carrying, lifting, house/yardwork, driving/computer work    Modalities:      Charges:  Timed Code Treatment Minutes: 23   Total Treatment Minutes: 52     [x] EVAL (LOW) 98141   [] EVAL (MOD) 35484   [] EVAL (HIGH) 21862   [] RE-EVAL   [x] VA(02087) x 1   [] IONTO  [] NMR (25729) x     [] VASO  [] Manual (61856) x      [] Other:  [x] TA x  1    [] Mech Traction (27674)  [] ES(attended) (83098)      [] ES (un) (00493):       Liam Rodríguez stated goal: improve shoulder movement and strength     Therapist goals for Patient:   Short Term Goals: To be achieved in: 2 weeks  1. Independent in HEP and progression per patient tolerance, in order to prevent re-injury. []? Progressing: []? Met: []? Not Met: []? Adjusted   2. Patient will have a decrease in pain to facilitate improvement in movement, function, and ADLs as indicated by Functional Deficits. []? Progressing: []? Met: []? Not Met: []? Adjusted     Long Term Goals: To be achieved in:  4-5 months  1. Disability index score of 80% or more for the UEFI to assist with reaching prior level of function. []? Progressing: []? Met: []? Not Met: []? Adjusted  2. Patient will demonstrate increased R shoulder AROM to  140 flex, 140 abd, and ER at 80 to 90 degrees to allow for proper joint functioning as indicated by patients Functional Deficits. []? Progressing: []? Met: []? Not Met: []? Adjusted  3. Patient will demonstrate an increase in R shoulder strength to 4/5 flex, abd, ER in UE to allow for proper functional mobility as indicated by patients Functional Deficits. []? Progressing: []? Met: []? Not Met: []? Adjusted  4. Patient will return to ADLs above shoulder height without increased symptoms or restriction. []? Progressing: []? Met: []? Not Met: []? Adjusted  5. Pt will return to getting dressed and self care without pain or limitations in R shoulder. []? Progressing: []? Met: []? Not Met: []? Adjusted       Overall Progression Towards Functional goals/ Treatment Progress Update:  [] Patient is progressing as expected towards functional goals listed. [] Progression is slowed due to complexities/Impairments listed. [] Progression has been slowed due to co-morbidities.   [x] Plan just implemented, too soon to assess goals progression <30days   [] Goals require adjustment due to lack of progress  [] Patient is not progressing as expected and requires additional follow up with physician  [] Other    Prognosis for POC: [x] Good [] Fair  [] Poor      Patient requires continued skilled intervention: [x] Yes  [] No      ASSESSMENT:  See eval    Treatment/Activity Tolerance:  [x] Patient tolerated treatment well [] Patient limited by fatique  [] Patient limited by pain  [] Patient limited by other medical complications  [] Other:       Return to Play: (if applicable)   []  Stage 1: Intro to Strength   []  Stage 2: Return to Run and Strength   []  Stage 3: Return to Jump and Strength   []  Stage 4: Dynamic Strength and Agility   []  Stage 5: Sport Specific Training     []  Ready to Return to Play, Meets All Above Stages   []  Not Ready for Return to Sports   Comments:            Prognosis: [x] Good [] Fair  [] Poor    Patient Requires Follow-up: [x] Yes  [] No    PLAN: See eval; consider wrist flex/extn with weight, ER to 30, elbow AROM  [] Continue per plan of care [] Alter current plan (see comments above)  [x] Plan of care initiated [] Hold pending MD visit [] Discharge    Note: If patient does not return for scheduled/ recommended follow up visits, this note will serve as a discharge from care along with most recent update on progress.      Electronically signed by: José Vitale, PT PT, DPT

## 2022-02-28 ENCOUNTER — HOSPITAL ENCOUNTER (OUTPATIENT)
Dept: PHYSICAL THERAPY | Age: 52
Setting detail: THERAPIES SERIES
Discharge: HOME | End: 2022-02-28
Payer: COMMERCIAL

## 2022-02-28 PROCEDURE — 97110 THERAPEUTIC EXERCISES: CPT | Performed by: PHYSICAL THERAPIST

## 2022-02-28 PROCEDURE — 97530 THERAPEUTIC ACTIVITIES: CPT | Performed by: PHYSICAL THERAPIST

## 2022-02-28 NOTE — FLOWSHEET NOTE
[address] [facility], [place]                                                         Physical Therapy Daily Treatment Note  Date:  2022    Patient Name:  Jani Boswell    :  1970  MRN: [id number]    Medical/Treatment Diagnosis Information:  · Diagnosis: M69.932 (ICD-10-CM) - status post right shoulder total arthroplasty; DOS: 2/3/22  · Treatment Diagnosis: M25.511 (R) shoulder pain; M62.81 muscle weakness  Insurance/Certification information:  PT Insurance Information: [facility]- 20 visits, no auth  Physician Information:  Referring Practitioner: Lilliam Simmons MD  Has the plan of care been signed (Y/N):        [x]  Yes  []  No     Date of Patient follow up with Physician: around 3/18/22      Is this a Progress Report:     []  Yes  [x]  No        If Yes:  Date Range for reporting period:  Beginning- 2022   Ending    Progress report will be due (10 Rx or 30 days whichever is less): 10 visits or 93       Recertification will be due (POC Duration  / 90 days whichever is less): as above        Visit # Insurance Allowable Auth Required   2 20 []  Yes [x]  No        Functional Scale: UEFI - 21.25%        Date assessed:  2022      Latex Allergy:  [x]NO      []YES  Preferred Language for Healthcare:   [x]English       []other:      Pain level:  4/10  on 2022    SUBJECTIVE:  Pt is 3.5 weeks post op. Pt says exercises are going fine and getting them in every day.      OBJECTIVE: See eval   Observation:    Test measurements:      CERV ROM       Cervical Flexion       Cervical Extension       Cervical SB       Cervical rotation                     ROM PROM AROM  Comment     L R-22 L R     Flexion    90 table slide         Abduction    91 scap table slide         ER at side    24         ER at 90 abd             BB IR             IR at 90 abd             Other             Other                  Strength- deferred due to recent surgery L R Comment   Flexion         Abduction         ER         IR         Supraspinatus         Upper Trap         Lower Trap         Mid Trap         Rhomboids         Biceps         Triceps         Horizontal Abduction         Horizontal Adduction         Lats            Orthopedic Special Tests: deferred  Special Tests Left Right   Apley Scratch IR:  ER:   Cross body: IR:  ER:  Cross Body:   Neer's       Full Can       Empty Can       Fernanda        Nerve Tension Testing       Speed's       Conti's        Spurling's       Repeated Scaption                        Reflexes/Sensation (myotomes/dermatomes):  able to sense light touch in hand and arm     Joint mobility: n/t              []?Normal                       []?Hypo              []?Hyper     Palpation: n/t     Functional Mobility/Transfers: Indep     Posture: arrived in sling without abd pillow     Bandages/Dressings/Incisions: covered with Pernio type dressing. Open area that has been draining at superior incision. No redness or rash noted.  MD/NP aware of drainage and said it was clear.        Gait: (include devices/WB status) Indep          RESTRICTIONS/PRECAUTIONS: HTN, OA, Dizziness/vertigo- mostly at night when he gets up; disabled due to his back    Exercises/Interventions:     Exercise/Equipment Resistance/Repetitions Other comments   Aerobic Conditioning     Aerodyne          Stretching/PROM     Cane ER Seated at side 10 x 10 secs  Limit to 30 deg   Wand flex     Towel press up     Passive shoulder flex with opposite UE assist     Ball on wall for shoulder flex     Elbow PROM     Side-lying flex     Table Slides 10 x 10 secs flex and scap Limit to 90 degrees   Wall slides      UE [place]     Pulleys     Pendulum hold/forward bow 10 x 10 secs    BB IR     SL IR     Pec doorway stretch     CBA stretch     UT stretch     LS stretch     Isometrics     Retraction 10 x 10 secs      X  50 blue Weight shift     Flexion     Abduction     External Rotation     Internal Rotation     Biceps     Triceps          PRE's     Flexion     Abduction     External Rotation     Internal Rotation     Shrugs 3 x 10     EXT     Reverse Flys     Serratus     Horizontal Abd with ER     Biceps AROM x 30    Triceps     Retraction     Wrist flex/extn 3 x 10 2 lbs each    Cable Column/Theraband     External Rotation     Internal Rotation     Shrugs     Lats     Ext     Flex     Scapular Retraction     BIC     TRIC     PNF          Dynamic Stability          Plyoback            Patient education: Pt was educated on PT diagnosis, prognosis, and plan of care. Pt was educated on the use of ice throughout the day. Pt was educated on signs/symptoms of infection and to call with any questions or concerns. Pt educated on dressing, precautions and restrictions. Therapeutic Exercise and NMR EXR  [x] (83598) Provided verbal/tactile cueing for activities related to strengthening, flexibility, endurance, ROM  for improvements in scapular, scapulothoracic and UE control with self care, reaching, carrying, lifting, house/yardwork, driving/computer work.    [] (07844) Provided verbal/tactile cueing for activities related to improving balance, coordination, kinesthetic sense, posture, motor skill, proprioception  to assist with  scapular, scapulothoracic and UE control with self care, reaching, carrying, lifting, house/yardwork, driving/computer work. Therapeutic Activities:    [x] (22835 or 21451) Provided verbal/tactile cueing for activities related to improving balance, coordination, kinesthetic sense, posture, motor skill, proprioception and motor activation to allow for proper function of scapular, scapulothoracic and UE control with self care, carrying, lifting, driving/computer work.      Home Exercise Program:    [x] (12878) Reviewed/Progressed HEP activities related to strengthening, flexibility, endurance, ROM of scapular, scapulothoracic and UE control with self care, reaching, carrying, lifting, house/yardwork, driving/computer work  [] (18586) Reviewed/Progressed HEP activities related to improving balance, coordination, kinesthetic sense, posture, motor skill, proprioception of scapular, scapulothoracic and UE control with self care, reaching, carrying, lifting, house/yardwork, driving/computer work    Access Code: [email or web address]  URL: [email or web address]  Date: 02/28/2022  Prepared by: Jericho Jonas    Exercises  Circular Shoulder Pendulum with Table Support - 2 x daily - 7 x weekly - 1 sets - 10 reps - 10 hold  Seated Shoulder Flexion Towel Slide at Table Top - 2 x daily - 7 x weekly - 10 reps - 1 sets - 10 hold  Seated Shoulder Scaption Towel Slide at Table Top - 2 x daily - 7 x weekly - 10 reps - 1 sets - 10 hold  Seated Shoulder External Rotation AAROM with Cane and Hand in Neutral - 2 x daily - 7 x weekly - 10 reps - 1 sets - 10 hold  Forearm Strengthening with Ball Squeeze - 2 x daily - 7 x weekly - 3 sets - 10 reps  Seated Elbow Flexion AAROM - 2 x daily - 7 x weekly - 10 reps - 1 sets - 10 hold  Standing Elbow Flexion Extension AROM - 1 x daily - 7 x weekly - 3 sets - 10 reps  Seated Scapular Retraction - 2 x daily - 7 x weekly - 10 reps - 10 hold  Seated Shoulder Shrugs - 2 x daily - 7 x weekly - 3 sets - 10 reps  Seated Wrist Flexion with Dumbbell - 1 x daily - 7 x weekly - 3 sets - 10 reps  Seated Wrist Extension with Dumbbell - 1 x daily - 7 x weekly - 3 sets - 10 reps      Manual Treatments:  PROM / STM / Oscillations-Mobs:  G-I, II, III, IV (PA's, Inf., Post.)  [] (55512) Provided manual therapy to mobilize soft tissue/joints of cervical/CT, scapular GHJ and UE for the purpose of modulating pain, promoting relaxation,  increasing ROM, reducing/eliminating soft tissue swelling/inflammation/restriction, improving soft tissue extensibility and allowing for proper ROM for normal function with self care, reaching, carrying, lifting, house/yardwork, driving/computer work    Modalities:      Charges:  Timed Code Treatment Minutes: 25   Total Treatment Minutes: 30     [] EVAL (LOW) 88578   [] EVAL (MOD) 28041   [] EVAL (HIGH) 48661   [] RE-EVAL   [x] ZH(44824) x 1   [] IONTO  [] NMR (80241) x     [] VASO  [] Manual (36483) x      [] Other:  [x] TA x  1    [] Mech Traction (39701)  [] ES(attended) (28099)      [] ES (un) (91933):       Vidhya Wilburn stated goal: improve shoulder movement and strength     Therapist goals for Patient:   Short Term Goals: To be achieved in: 2 weeks  1. Independent in HEP and progression per patient tolerance, in order to prevent re-injury. []? Progressing: []? Met: []? Not Met: []? Adjusted   2. Patient will have a decrease in pain to facilitate improvement in movement, function, and ADLs as indicated by Functional Deficits. []? Progressing: []? Met: []? Not Met: []? Adjusted     Long Term Goals: To be achieved in:  4-5 months  1. Disability index score of 80% or more for the UEFI to assist with reaching prior level of function. []? Progressing: []? Met: []? Not Met: []? Adjusted  2. Patient will demonstrate increased R shoulder AROM to  140 flex, 140 abd, and ER at 80 to 90 degrees to allow for proper joint functioning as indicated by patients Functional Deficits. []? Progressing: []? Met: []? Not Met: []? Adjusted  3. Patient will demonstrate an increase in R shoulder strength to 4/5 flex, abd, ER in UE to allow for proper functional mobility as indicated by patients Functional Deficits. []? Progressing: []? Met: []? Not Met: []? Adjusted  4. Patient will return to ADLs above shoulder height without increased symptoms or restriction. []? Progressing: []? Met: []? Not Met: []? Adjusted  5. Pt will return to getting dressed and self care without pain or limitations in R shoulder. []? Progressing: []? Met: []? Not Met: []?  Adjusted       Overall Progression Towards Functional goals/ Treatment Progress Update:  [] Patient is progressing as expected towards functional goals listed. [] Progression is slowed due to complexities/Impairments listed. [] Progression has been slowed due to co-morbidities. [x] Plan just implemented, too soon to assess goals progression <30days   [] Goals require adjustment due to lack of progress  [] Patient is not progressing as expected and requires additional follow up with physician  [] Other    Prognosis for POC: [x] Good [] Fair  [] Poor      Patient requires continued skilled intervention: [x] Yes  [] No      ASSESSMENT:  See eval    Treatment/Activity Tolerance:  [x] Patient tolerated treatment well [] Patient limited by fatique  [] Patient limited by pain  [] Patient limited by other medical complications  [x] Other: pt demonstrated good improvement in shoulder ROM with table slides getting up to 90 which is current limit per protocol. He was tight with ER at side today but did loosen up as he went. He was still limited to 25 degrees by end so will add this to HEP with education to limit to 30 degrees. He was a little sore with elbow AROM added today. He will continue to be progressed in PT per post op protocol.        Return to Play: (if applicable)   []  Stage 1: Intro to Strength   []  Stage 2: Return to Run and Strength   []  Stage 3: Return to Jump and Strength   []  Stage 4: Dynamic Strength and Agility   []  Stage 5: Sport Specific Training     []  Ready to Return to Play, Meets All Above Stages   []  Not Ready for Return to Sports   Comments:            Prognosis: [x] Good [] Fair  [] Poor    Patient Requires Follow-up: [x] Yes  [] No    PLAN: See eval;   [x] Continue per plan of care [] Alter current plan (see comments above)  [] Plan of care initiated [] Hold pending MD visit [] Discharge    Note: If patient does not return for scheduled/ recommended follow up visits, this note will serve as a discharge from care along with most recent update on progress.      Electronically signed by: Sony Viera, PT PT, DPT

## 2022-03-03 ENCOUNTER — HOSPITAL ENCOUNTER (OUTPATIENT)
Dept: PHYSICAL THERAPY | Age: 52
Setting detail: THERAPIES SERIES
Discharge: HOME | End: 2022-03-03
Payer: COMMERCIAL

## 2022-03-03 NOTE — FLOWSHEET NOTE
[address]       Physical Therapy   Phone: [phone]   Fax: [phone]      Physical Therapy  Cancellation/No-show Note  Patient Name:  Rabon Lesch  :  1970   Date:  3/3/2022  Cancelled visits to date: 1  No-shows to date: 0    For today's appointment patient:  [x]  Cancelled  []  Rescheduled appointment  []  No-show     Reason given by patient:  [x]  Patient ill- vomiting  []  Conflicting appointment  []  No transportation    []  Conflict with work  []  No reason given  []  Other:     Comments:      Electronically signed by:  Bertin Santiago PT, PT

## 2022-03-07 ENCOUNTER — HOSPITAL ENCOUNTER (OUTPATIENT)
Dept: PHYSICAL THERAPY | Age: 52
Setting detail: THERAPIES SERIES
Discharge: HOME | End: 2022-03-07
Payer: COMMERCIAL

## 2022-03-07 PROCEDURE — 97530 THERAPEUTIC ACTIVITIES: CPT | Performed by: PHYSICAL THERAPIST

## 2022-03-07 PROCEDURE — 97110 THERAPEUTIC EXERCISES: CPT | Performed by: PHYSICAL THERAPIST

## 2022-03-07 NOTE — FLOWSHEET NOTE
[address] [facility], [place]                                                         Physical Therapy Daily Treatment Note  Date:  3/7/2022    Patient Name:  Daniela Paz    :  1970  MRN: [id number]    Medical/Treatment Diagnosis Information:  · Diagnosis: J00.246 (ICD-10-CM) - status post right shoulder total arthroplasty; DOS: 2/3/22  · Treatment Diagnosis: M25.511 (R) shoulder pain; M62.81 muscle weakness  Insurance/Certification information:  PT Insurance Information: [facility]- 20 visits, no auth  Physician Information:  Referring Practitioner: Av Fatima MD  Has the plan of care been signed (Y/N):        [x]  Yes  []  No     Date of Patient follow up with Physician: around 3/18/22      Is this a Progress Report:     []  Yes  [x]  No        If Yes:  Date Range for reporting period:  Beginning- 2022   Ending    Progress report will be due (10 Rx or 30 days whichever is less): 10 visits or 62       Recertification will be due (POC Duration  / 90 days whichever is less): as above        Visit # Insurance Allowable Auth Required   3 20 []  Yes [x]  No        Functional Scale: UEFI - 21.25%        Date assessed:  2022      Latex Allergy:  [x]NO      []YES  Preferred Language for Healthcare:   [x]English       []other:      Pain level:  4/10  on 3/7/2022    SUBJECTIVE:  Pt is 4.5 weeks post op. Pt says he still struggles to sleep and feels more pain in shoulder trying to sleep in bed. He tried pillows under arm. He has been doing exercises.      OBJECTIVE: See eval   Observation:    Test measurements:      CERV ROM       Cervical Flexion       Cervical Extension       Cervical SB       Cervical rotation                     ROM PROM AROM  Comment     L R-3/7/22 L R     Flexion    94 table slide         Abduction    98 scap table slide         ER at side    30         ER at 90 abd             BB IR             IR at 90 abd             Other             Other                    Strength- deferred due to recent surgery L R Comment   Flexion         Abduction         ER         IR         Supraspinatus         Upper Trap         Lower Trap         Mid Trap         Rhomboids         Biceps         Triceps         Horizontal Abduction         Horizontal Adduction         Lats            Orthopedic Special Tests: deferred  Special Tests Left Right   Apley Scratch IR:  ER:   Cross body: IR:  ER:  Cross Body:   Neer's       Full Can       Empty Can       Michael Quant       Nerve Tension Testing       Speed's       Conti's        Spurling's       Repeated Scaption                        Reflexes/Sensation (myotomes/dermatomes):  able to sense light touch in hand and arm     Joint mobility: n/t              []?Normal                       []?Hypo              []?Hyper     Palpation: n/t     Functional Mobility/Transfers: Indep     Posture: arrived in sling without abd pillow     Bandages/Dressings/Incisions: covered with Pernio type dressing. Open area that has been draining at superior incision. No redness or rash noted.  MD/NP aware of drainage and said it was clear.        Gait: (include devices/WB status) Indep       RESTRICTIONS/PRECAUTIONS: HTN, OA, Dizziness/vertigo- mostly at night when he gets up; disabled due to his back    Exercises/Interventions:     Exercise/Equipment Resistance/Repetitions Other comments   Aerobic Conditioning     Aerodyne          Stretching/PROM     Cane ER seated at side 10 x 10 secs  Limit to 30 deg   Wand flex     Towel press up     Passive shoulder flex with opposite UE assist     Ball on wall for shoulder flex     Elbow PROM     Side-lying flex     Table Slides 10 x 10 secs flex and scap Limit to 120 degrees   Wall slides      UE [place]     Pulleys  10 x 10 secs flex Limit to 120 degrees   Pendulum hold/forward bow     BB IR     SL IR     Pec doorway stretch     CBA stretch     UT stretch     LS stretch     Isometrics     Retraction 10 x 10 secs           Weight shift     Flexion     Abduction     External Rotation     Internal Rotation     Biceps     Triceps          PRE's     Flexion     Abduction     External Rotation     Internal Rotation     Shrugs 3 x 10     EXT     Reverse Flys     Serratus 3 x 10 with cane assist    Horizontal Abd with ER     Biceps 3 x 10 1 lbs    Triceps     Retraction     Wrist flex/extn 3 x 10 2 lbs each    Cable Column/Theraband     External Rotation     Internal Rotation     Shrugs     Lats     Ext     Flex     Scapular Retraction     BIC     TRIC 3 x 10 green TB    PNF          Dynamic Stability          Plyoback            Patient education: Pt was educated on PT diagnosis, prognosis, and plan of care. Pt was educated on the use of ice throughout the day. Pt was educated on signs/symptoms of infection and to call with any questions or concerns. Pt educated on dressing, precautions and restrictions. Therapeutic Exercise and NMR EXR  [x] (00948) Provided verbal/tactile cueing for activities related to strengthening, flexibility, endurance, ROM  for improvements in scapular, scapulothoracic and UE control with self care, reaching, carrying, lifting, house/yardwork, driving/computer work.    [] (46170) Provided verbal/tactile cueing for activities related to improving balance, coordination, kinesthetic sense, posture, motor skill, proprioception  to assist with  scapular, scapulothoracic and UE control with self care, reaching, carrying, lifting, house/yardwork, driving/computer work. Therapeutic Activities:    [x] (41005 or 00957) Provided verbal/tactile cueing for activities related to improving balance, coordination, kinesthetic sense, posture, motor skill, proprioception and motor activation to allow for proper function of scapular, scapulothoracic and UE control with self care, carrying, lifting, driving/computer work.      Home Exercise Program: [x] (41623) Reviewed/Progressed HEP activities related to strengthening, flexibility, endurance, ROM of scapular, scapulothoracic and UE control with self care, reaching, carrying, lifting, house/yardwork, driving/computer work  [] (19717) Reviewed/Progressed HEP activities related to improving balance, coordination, kinesthetic sense, posture, motor skill, proprioception of scapular, scapulothoracic and UE control with self care, reaching, carrying, lifting, house/yardwork, driving/computer work    Access Code: [email or web address]  URL: [email or web address]  Date: 02/28/2022  Prepared by: Manju Phelan    Exercises  Circular Shoulder Pendulum with Table Support - 2 x daily - 7 x weekly - 1 sets - 10 reps - 10 hold  Seated Shoulder Flexion Towel Slide at Table Top - 2 x daily - 7 x weekly - 10 reps - 1 sets - 10 hold  Seated Shoulder Scaption Towel Slide at Table Top - 2 x daily - 7 x weekly - 10 reps - 1 sets - 10 hold  Seated Shoulder External Rotation AAROM with Cane and Hand in Neutral - 2 x daily - 7 x weekly - 10 reps - 1 sets - 10 hold  Forearm Strengthening with Ball Squeeze - 2 x daily - 7 x weekly - 3 sets - 10 reps  Seated Elbow Flexion AAROM - 2 x daily - 7 x weekly - 10 reps - 1 sets - 10 hold  Standing Elbow Flexion Extension AROM - 1 x daily - 7 x weekly - 3 sets - 10 reps  Seated Scapular Retraction - 2 x daily - 7 x weekly - 10 reps - 10 hold  Seated Shoulder Shrugs - 2 x daily - 7 x weekly - 3 sets - 10 reps  Seated Wrist Flexion with Dumbbell - 1 x daily - 7 x weekly - 3 sets - 10 reps  Seated Wrist Extension with Dumbbell - 1 x daily - 7 x weekly - 3 sets - 10 reps      Manual Treatments:  PROM / STM / Oscillations-Mobs:  G-I, II, III, IV (PA's, Inf., Post.)  [] (93532) Provided manual therapy to mobilize soft tissue/joints of cervical/CT, scapular GHJ and UE for the purpose of modulating pain, promoting relaxation,  increasing ROM, reducing/eliminating soft tissue swelling/inflammation/restriction, improving soft tissue extensibility and allowing for proper ROM for normal function with self care, reaching, carrying, lifting, house/yardwork, driving/computer work    Modalities:      Charges:  Timed Code Treatment Minutes: 25   Total Treatment Minutes: 28     [] EVAL (LOW) 63126   [] EVAL (MOD) 06470   [] EVAL (HIGH) 37758   [] RE-EVAL   [x] QK(75564) x 1   [] IONTO  [] NMR (30341) x     [] VASO  [] Manual (31705) x      [] Other:  [x] TA x  1    [] Mech Traction (86650)  [] ES(attended) (34337)      [] ES (un) (48121):       Juan Munoz stated goal: improve shoulder movement and strength     Therapist goals for Patient:   Short Term Goals: To be achieved in: 2 weeks  1. Independent in HEP and progression per patient tolerance, in order to prevent re-injury. []? Progressing: []? Met: []? Not Met: []? Adjusted   2. Patient will have a decrease in pain to facilitate improvement in movement, function, and ADLs as indicated by Functional Deficits. []? Progressing: []? Met: []? Not Met: []? Adjusted     Long Term Goals: To be achieved in:  4-5 months  1. Disability index score of 80% or more for the UEFI to assist with reaching prior level of function. []? Progressing: []? Met: []? Not Met: []? Adjusted  2. Patient will demonstrate increased R shoulder AROM to  140 flex, 140 abd, and ER at 80 to 90 degrees to allow for proper joint functioning as indicated by patients Functional Deficits. []? Progressing: []? Met: []? Not Met: []? Adjusted  3. Patient will demonstrate an increase in R shoulder strength to 4/5 flex, abd, ER in UE to allow for proper functional mobility as indicated by patients Functional Deficits. []? Progressing: []? Met: []? Not Met: []? Adjusted  4. Patient will return to ADLs above shoulder height without increased symptoms or restriction. []? Progressing: []? Met: []? Not Met: []? Adjusted  5.  Pt will return to getting dressed and self care follow up visits, this note will serve as a discharge from care along with most recent update on progress.      Electronically signed by: Ashley Rogers, PT PT, DPT

## 2022-03-10 ENCOUNTER — HOSPITAL ENCOUNTER (OUTPATIENT)
Dept: PHYSICAL THERAPY | Age: 52
Setting detail: THERAPIES SERIES
Discharge: HOME | End: 2022-03-10
Payer: COMMERCIAL

## 2022-03-10 PROCEDURE — 97530 THERAPEUTIC ACTIVITIES: CPT | Performed by: PHYSICAL THERAPIST

## 2022-03-10 PROCEDURE — 97112 NEUROMUSCULAR REEDUCATION: CPT | Performed by: PHYSICAL THERAPIST

## 2022-03-10 PROCEDURE — 97110 THERAPEUTIC EXERCISES: CPT | Performed by: PHYSICAL THERAPIST

## 2022-03-10 NOTE — FLOWSHEET NOTE
[address] [facility], [place]                                                         Physical Therapy Daily Treatment Note  Date:  3/10/2022    Patient Name:  Adam Acuna    :  1970  MRN: [id number]    Medical/Treatment Diagnosis Information:  · Diagnosis: M12.553 (ICD-10-CM) - status post right shoulder total arthroplasty; DOS: 2/3/22  · Treatment Diagnosis: M25.511 (R) shoulder pain; M62.81 muscle weakness  Insurance/Certification information:  PT Insurance Information: [facility]- 20 visits, no auth  Physician Information:  Referring Practitioner: Aleshia Jackson MD  Has the plan of care been signed (Y/N):        [x]  Yes  []  No     Date of Patient follow up with Physician: around 3/18/22      Is this a Progress Report:     []  Yes  [x]  No        If Yes:  Date Range for reporting period:  Beginning- 2022   Ending    Progress report will be due (10 Rx or 30 days whichever is less): 10 visits or        Recertification will be due (POC Duration  / 90 days whichever is less): as above        Visit # Insurance Allowable Auth Required   4 20 []  Yes [x]  No        Functional Scale: UEFI - 21.25%        Date assessed:  2022      Latex Allergy:  [x]NO      []YES  Preferred Language for Healthcare:   [x]English       []other:      Pain level:  3/10  on 3/10/2022    SUBJECTIVE:  Pt is 5 weeks post op. Pt was sore after last session. He was sore the next day. It is getting less sore today. Still doing his exercises. He is usually more sore in the mornings.      OBJECTIVE: See eval   Observation:    Test measurements:      CERV ROM       Cervical Flexion       Cervical Extension       Cervical SB       Cervical rotation                     ROM PROM AROM  Comment     L R-3/10/22 L R     Flexion    101 table slide; 110 pulleys         Abduction    98 scap table slide         ER at side    22 supine         ER at 80 abd             BB IR             IR at 90 abd             Other             Other                    Strength- deferred due to recent surgery L R Comment   Flexion         Abduction         ER         IR         Supraspinatus         Upper Trap         Lower Trap         Mid Trap         Rhomboids         Biceps         Triceps         Horizontal Abduction         Horizontal Adduction         Lats            Orthopedic Special Tests: deferred  Special Tests Left Right   Apley Scratch IR:  ER:   Cross body: IR:  ER:  Cross Body:   Neer's       Full Can       Empty Can       Ilsa Lanius       Nerve Tension Testing       Speed's       Conti's        Spurling's       Repeated Scaption                        Reflexes/Sensation (myotomes/dermatomes):  able to sense light touch in hand and arm     Joint mobility: n/t              []?Normal                       []?Hypo              []?Hyper     Palpation: n/t     Functional Mobility/Transfers: Indep     Posture: arrived in sling without abd pillow     Bandages/Dressings/Incisions: covered with Pernio type dressing. Open area that has been draining at superior incision. No redness or rash noted.  MD/NP aware of drainage and said it was clear.        Gait: (include devices/WB status) Indep       RESTRICTIONS/PRECAUTIONS: HTN, OA, Dizziness/vertigo- mostly at night when he gets up; disabled due to his back    Exercises/Interventions:     Exercise/Equipment Resistance/Repetitions Other comments   Aerobic Conditioning     Aerodyne          Stretching/PROM     Cane ER Supine at side 10 x 10 secs  Limit to 30 deg   Wand flex     Towel press up     Passive shoulder flex with opposite UE assist     Ball on wall for shoulder flex     Elbow PROM     Side-lying flex     Table Slides 10 x 10 secs flex and scap Limit to 120 degrees   Wall slides      UE [place]     Pulleys  10 x 10 secs flex and scap Limit to 120 degrees   Pendulum hold/forward bow     BB IR     SL IR     Pec doorway stretch     CBA stretch     UT stretch     LS stretch     Isometrics     Retraction 10 x 10 secs           Weight shift     Flexion 10 x 10 secs    Abduction 10 x 10 secs    External Rotation     Internal Rotation     Biceps     Triceps          PRE's     Flexion     Abduction     External Rotation     Internal Rotation     Shrugs 3 x 10 2 lbs    EXT     Reverse Flys     Serratus 3 x 10 with cane assist    Horizontal Abd with ER     Biceps 3 x 10 2 lbs    Triceps     Retraction     Wrist flex/extn 3 x 10 3 lbs each    Cable Column/Theraband     External Rotation     Internal Rotation     Shrugs     Lats     Ext     Flex     Scapular Retraction     BIC     TRIC 3 x 10 blue TB    PNF          Dynamic Stability          Plyoback            Patient education: Pt was educated on PT diagnosis, prognosis, and plan of care. Pt was educated on the use of ice throughout the day. Pt was educated on signs/symptoms of infection and to call with any questions or concerns. Pt educated on dressing, precautions and restrictions. Therapeutic Exercise and NMR EXR  [x] (17961) Provided verbal/tactile cueing for activities related to strengthening, flexibility, endurance, ROM  for improvements in scapular, scapulothoracic and UE control with self care, reaching, carrying, lifting, house/yardwork, driving/computer work.    [] (78406) Provided verbal/tactile cueing for activities related to improving balance, coordination, kinesthetic sense, posture, motor skill, proprioception  to assist with  scapular, scapulothoracic and UE control with self care, reaching, carrying, lifting, house/yardwork, driving/computer work.     Therapeutic Activities:    [x] (30700 or 86042) Provided verbal/tactile cueing for activities related to improving balance, coordination, kinesthetic sense, posture, motor skill, proprioception and motor activation to allow for proper function of scapular, scapulothoracic and UE control with self care, carrying, lifting, driving/computer work. Home Exercise Program:    [x] (44840) Reviewed/Progressed HEP activities related to strengthening, flexibility, endurance, ROM of scapular, scapulothoracic and UE control with self care, reaching, carrying, lifting, house/yardwork, driving/computer work  [] (56835) Reviewed/Progressed HEP activities related to improving balance, coordination, kinesthetic sense, posture, motor skill, proprioception of scapular, scapulothoracic and UE control with self care, reaching, carrying, lifting, house/yardwork, driving/computer work    Access Code: [email or web address]  URL: [email or web address]  Date: 03/10/2022  Prepared by: Viji Alaniz    Exercises  Seated Shoulder Flexion Towel Slide at Table Top - 2 x daily - 7 x weekly - 10 reps - 1 sets - 10 hold  Seated Shoulder Scaption Towel Slide at Table Top - 2 x daily - 7 x weekly - 10 reps - 1 sets - 10 hold  Seated Shoulder Flexion AAROM with Pulley Behind - 2 x daily - 7 x weekly - 10 reps - 1 sets - 10 hold  Seated Shoulder Scaption AAROM with Pulley at Side - 2 x daily - 7 x weekly - 10 reps - 1 sets - 10 hold  Seated Shoulder External Rotation AAROM with Cane and Hand in Neutral - 2 x daily - 7 x weekly - 10 reps - 1 sets - 10 hold  Supine Shoulder External Rotation with [place]el - 2 x daily - 7 x weekly - 10 reps - 1 sets - 10 hold  Supine Scapular Protraction in Flexion with Dumbbells - 1 x daily - 7 x weekly - 3 sets - 10 reps  Isometric Shoulder Flexion at Wall - 1 x daily - 7 x weekly - 1 sets - 10 reps - 10 hold  Isometric Shoulder Abduction at Wall - 1 x daily - 7 x weekly - 1 sets - 10 reps - 10 hold  Standing Bicep Curls Supinated with Dumbbells - 1 x daily - 7 x weekly - 3 sets - 10 reps  Standing Elbow Extension with Anchored Resistance - 1 x daily - 7 x weekly - 3 sets - 10 reps  Seated Scapular Retraction - 2 x daily - 7 x weekly - 10 reps - 10 hold  Seated Shoulder Shrugs - 2 x daily - 7 x weekly - 3 sets - 10 reps         Manual Treatments:  PROM / STM / Oscillations-Mobs:  G-I, II, III, IV (PA's, Inf., Post.)  [] (93623) Provided manual therapy to mobilize soft tissue/joints of cervical/CT, scapular GHJ and UE for the purpose of modulating pain, promoting relaxation,  increasing ROM, reducing/eliminating soft tissue swelling/inflammation/restriction, improving soft tissue extensibility and allowing for proper ROM for normal function with self care, reaching, carrying, lifting, house/yardwork, driving/computer work    Modalities:      Charges:  Timed Code Treatment Minutes: 45   Total Treatment Minutes: 45     [] EVAL (LOW) 20092   [] EVAL (MOD) 28199   [] EVAL (HIGH) 12173   [] RE-EVAL   [x] GQ(81553) x 1   [] IONTO  [x] NMR (93772) x 1    [] VASO  [] Manual (80339) x      [] Other:  [x] TA x  1    [] Mech Traction (70621)  [] ES(attended) (37050)      [] ES (un) (27843):       Reese Conley stated goal: improve shoulder movement and strength     Therapist goals for Patient:   Short Term Goals: To be achieved in: 2 weeks  1. Independent in HEP and progression per patient tolerance, in order to prevent re-injury. []? Progressing: []? Met: []? Not Met: []? Adjusted   2. Patient will have a decrease in pain to facilitate improvement in movement, function, and ADLs as indicated by Functional Deficits. []? Progressing: []? Met: []? Not Met: []? Adjusted     Long Term Goals: To be achieved in:  4-5 months  1. Disability index score of 80% or more for the UEFI to assist with reaching prior level of function. []? Progressing: []? Met: []? Not Met: []? Adjusted  2. Patient will demonstrate increased R shoulder AROM to  140 flex, 140 abd, and ER at 80 to 90 degrees to allow for proper joint functioning as indicated by patients Functional Deficits. []? Progressing: []? Met: []? Not Met: []? Adjusted  3.  Patient will demonstrate an increase in R shoulder strength to 4/5 flex, abd, ER in UE to allow for proper functional mobility as indicated by patients Functional Deficits. []? Progressing: []? Met: []? Not Met: []? Adjusted  4. Patient will return to ADLs above shoulder height without increased symptoms or restriction. []? Progressing: []? Met: []? Not Met: []? Adjusted  5. Pt will return to getting dressed and self care without pain or limitations in R shoulder. []? Progressing: []? Met: []? Not Met: []? Adjusted       Overall Progression Towards Functional goals/ Treatment Progress Update:  [] Patient is progressing as expected towards functional goals listed. [] Progression is slowed due to complexities/Impairments listed. [] Progression has been slowed due to co-morbidities. [x] Plan just implemented, too soon to assess goals progression <30days   [] Goals require adjustment due to lack of progress  [] Patient is not progressing as expected and requires additional follow up with physician  [] Other    Prognosis for POC: [x] Good [] Fair  [] Poor      Patient requires continued skilled intervention: [x] Yes  [] No      ASSESSMENT:  See eval    Treatment/Activity Tolerance:  [x] Patient tolerated treatment well [] Patient limited by fatique  [] Patient limited by pain  [] Patient limited by other medical complications  [x] Other:  Pt is stiff with his ROM but able to improve in motion on table slides and pulleys today. Did change cane ER stretch to supine and pt felt more tightness with this. He was also progressed to a couple submax isometrics and educated to keep pressure light with these today. He was given an updated HEP and encouraged to get pulleys for at home. He was educated on trying to wean for sling more at home when he is not as active. He will continue to be progressed in PT per post op protocol.        Return to Play: (if applicable)   []  Stage 1: Intro to Strength   []  Stage 2: Return to Run and Strength   []  Stage 3: Return to Jump and Strength   []  Stage 4: Dynamic Strength and Agility   []  Stage 5: Sport Specific Training     []  Ready to Return to Play, [employer] Stages   []  Not Ready for Return to Sports   Comments:            Prognosis: [x] Good [] Fair  [] Poor    Patient Requires Follow-up: [x] Yes  [] No    PLAN: See eval; consider more isometrics, manual PROM as needed  [x] Continue per plan of care [] Alter current plan (see comments above)  [] Plan of care initiated [] Hold pending MD visit [] Discharge    Note: If patient does not return for scheduled/ recommended follow up visits, this note will serve as a discharge from care along with most recent update on progress.      Electronically signed by: Bertin Santiago, PT PT, DPT

## 2022-03-14 ENCOUNTER — HOSPITAL ENCOUNTER (OUTPATIENT)
Dept: PHYSICAL THERAPY | Age: 52
Setting detail: THERAPIES SERIES
Discharge: HOME | End: 2022-03-14
Payer: COMMERCIAL

## 2022-03-14 PROCEDURE — 97140 MANUAL THERAPY 1/> REGIONS: CPT | Performed by: PHYSICAL THERAPIST

## 2022-03-14 PROCEDURE — 97110 THERAPEUTIC EXERCISES: CPT | Performed by: PHYSICAL THERAPIST

## 2022-03-14 PROCEDURE — 97530 THERAPEUTIC ACTIVITIES: CPT | Performed by: PHYSICAL THERAPIST

## 2022-03-14 NOTE — FLOWSHEET NOTE
[facility], [place]                                                         Physical Therapy Daily Treatment Note  Date:  3/14/2022    Patient Name:  Adam Acuna    :  1970  MRN: [id number]    Medical/Treatment Diagnosis Information:  · Diagnosis: P00.110 (ICD-10-CM) - status post right shoulder total arthroplasty; DOS: 2/3/22  · Treatment Diagnosis: M25.511 (R) shoulder pain; M62.81 muscle weakness  Insurance/Certification information:  PT Insurance Information: [place]- 20 visits, no auth  Physician Information:  Referring Practitioner: Aleshia Jackson MD  Has the plan of care been signed (Y/N):        [x]  Yes  []  No     Date of Patient follow up with Physician: around 3/18/22      Is this a Progress Report:     []  Yes  [x]  No        If Yes:  Date Range for reporting period:  Beginning- 2022   Ending    Progress report will be due (10 Rx or 30 days whichever is less): 10 visits or        Recertification will be due (POC Duration  / 90 days whichever is less): as above        Visit # Insurance Allowable Auth Required   5 20 []  Yes [x]  No        Functional Scale: UEFI - 21.25%        Date assessed:  2022      Latex Allergy:  [x]NO      []YES  Preferred Language for Healthcare:   [x]English       []other:      Pain level:  4/10 at rest; 8/10 with ER stretch. on 3/14/2022    SUBJECTIVE:  Pt is 5.5 weeks post op. Pt states he got pulleys at home. He did them Friday and  morning. He thinks he may have overdone it on these as he had a spasm in anterior shoulder/pec after this. He had a pop in shoulder. Initially afterwards he felt great for 2 hours and then he has had more pain since. This was .      OBJECTIVE: See eval   Observation:    Test measurements:      CERV ROM       Cervical Flexion       Cervical Extension       Cervical SB       Cervical rotation            ROM PROM AROM  Comment     L R-3/14/22 L R     Flexion    100 table slide; 110 pulleys         Abduction   100 scap table slide         ER at side             ER at 90 abd             BB IR             IR at 90 abd             Other             Other                    Strength- deferred due to recent surgery L R Comment   Flexion         Abduction         ER         IR         Supraspinatus         Upper Trap         Lower Trap         Mid Trap         Rhomboids         Biceps         Triceps         Horizontal Abduction         Horizontal Adduction         Lats            Orthopedic Special Tests: deferred  Special Tests Left Right   Apley Scratch IR:  ER:   Cross body: IR:  ER:  Cross Body:   Neer's       Full Can       Empty Can       [place]       Nerve Tension Testing       Speed's       Conti's        Spurling's       Repeated Scaption                        Reflexes/Sensation (myotomes/dermatomes):  able to sense light touch in hand and arm     Joint mobility: n/t              []?Normal                       []?Hypo              []?Hyper     Palpation: n/t     Functional Mobility/Transfers: Indep     Posture: arrived in sling without abd pillow     Bandages/Dressings/Incisions: covered with Pernio type dressing. Open area that has been draining at superior incision. No redness or rash noted.  MD/NP aware of drainage and said it was clear.        Gait: (include devices/WB status) Indep       RESTRICTIONS/PRECAUTIONS: HTN, OA, Dizziness/vertigo- mostly at night when he gets up; disabled due to his back    Exercises/Interventions:     Exercise/Equipment Resistance/Repetitions Other comments   Aerobic Conditioning     Aerodyne          Stretching/PROM     Cane ER Supine at side 10 x 10 secs  Limit to 30 deg   Wand flex     Towel press up     Passive shoulder flex with opposite UE assist     Ball on wall for shoulder flex     Elbow PROM     Side-lying flex     Table Slides 10 x 10 secs flex and scap Limit to 120 degrees   Wall slides      UE [place]     Pulleys  10 x 10 secs flex and scap Limit to 120 degrees   Pendulum hold/forward bow     BB IR     SL IR     Pec doorway stretch     CBA stretch     UT stretch     LS stretch     Isometrics     Retraction 10 x 10 secs           Weight shift     Flexion 10 x 10 secs    Abduction 10 x 10 secs    External Rotation     Internal Rotation     Biceps     Triceps          PRE's     Flexion     Abduction     External Rotation     Internal Rotation     Shrugs     EXT     Reverse Flys     Serratus 3 x 10 with cane assist    Horizontal Abd with ER     Biceps 3 x 10 2 lbs    Triceps     Retraction     Wrist flex/extn     Cable Column/Theraband     External Rotation     Internal Rotation     Shrugs     Lats     Ext     Flex     Scapular Retraction     BIC     TRIC 3 x 10 blue TB    PNF          Dynamic Stability          Plyoback     Manual tx:     PROM X 8 mins flex and ER at side      Patient education: Pt was educated on PT diagnosis, prognosis, and plan of care. Pt was educated on the use of ice throughout the day. Pt was educated on signs/symptoms of infection and to call with any questions or concerns. Pt educated on dressing, precautions and restrictions. Therapeutic Exercise and NMR EXR  [x] (09123) Provided verbal/tactile cueing for activities related to strengthening, flexibility, endurance, ROM  for improvements in scapular, scapulothoracic and UE control with self care, reaching, carrying, lifting, house/yardwork, driving/computer work.    [] (59010) Provided verbal/tactile cueing for activities related to improving balance, coordination, kinesthetic sense, posture, motor skill, proprioception  to assist with  scapular, scapulothoracic and UE control with self care, reaching, carrying, lifting, house/yardwork, driving/computer work.     Therapeutic Activities:    [x] (32258 or 09036) Provided verbal/tactile cueing for activities related to improving balance, coordination, kinesthetic sense, posture, motor skill, proprioception and motor activation to allow for proper function of scapular, scapulothoracic and UE control with self care, carrying, lifting, driving/computer work. Home Exercise Program:    [x] (17667) Reviewed/Progressed HEP activities related to strengthening, flexibility, endurance, ROM of scapular, scapulothoracic and UE control with self care, reaching, carrying, lifting, house/yardwork, driving/computer work  [] (99459) Reviewed/Progressed HEP activities related to improving balance, coordination, kinesthetic sense, posture, motor skill, proprioception of scapular, scapulothoracic and UE control with self care, reaching, carrying, lifting, house/yardwork, driving/computer work    Access Code: [email or web address]  URL: [email or web address]  Date: 03/10/2022  Prepared by: Jazz Krishna    Exercises  Seated Shoulder Flexion Towel Slide at Table Top - 2 x daily - 7 x weekly - 10 reps - 1 sets - 10 hold  Seated Shoulder Scaption Towel Slide at Table Top - 2 x daily - 7 x weekly - 10 reps - 1 sets - 10 hold  Seated Shoulder Flexion AAROM with Pulley Behind - 2 x daily - 7 x weekly - 10 reps - 1 sets - 10 hold  Seated Shoulder Scaption AAROM with Pulley at Side - 2 x daily - 7 x weekly - 10 reps - 1 sets - 10 hold  Seated Shoulder External Rotation AAROM with Cane and Hand in Neutral - 2 x daily - 7 x weekly - 10 reps - 1 sets - 10 hold  Supine Shoulder External Rotation with Dowel - 2 x daily - 7 x weekly - 10 reps - 1 sets - 10 hold  Supine Scapular Protraction in Flexion with Dumbbells - 1 x daily - 7 x weekly - 3 sets - 10 reps  Isometric Shoulder Flexion at Wall - 1 x daily - 7 x weekly - 1 sets - 10 reps - 10 hold  Isometric Shoulder Abduction at Wall - 1 x daily - 7 x weekly - 1 sets - 10 reps - 10 hold  Standing Bicep Curls Supinated with Dumbbells - 1 x daily - 7 x weekly - 3 sets - 10 reps  Standing Elbow Extension with Anchored Resistance - 1 x daily - 7 x weekly - 3 sets - 10 reps  Seated Scapular Retraction - 2 x daily - 7 x weekly - 10 reps - 10 hold  Seated Shoulder Shrugs - 2 x daily - 7 x weekly - 3 sets - 10 reps         Manual Treatments:  PROM / STM / Oscillations-Mobs:  G-I, II, III, IV (PA's, Inf., Post.)  [x] (23278) Provided manual therapy to mobilize soft tissue/joints of cervical/CT, scapular GHJ and UE for the purpose of modulating pain, promoting relaxation,  increasing ROM, reducing/eliminating soft tissue swelling/inflammation/restriction, improving soft tissue extensibility and allowing for proper ROM for normal function with self care, reaching, carrying, lifting, house/yardwork, driving/computer work    Modalities:      Charges:  Timed Code Treatment Minutes: 38   Total Treatment Minutes: 45     [] EVAL (LOW) 07659   [] EVAL (MOD) 35412   [] EVAL (HIGH) 37104   [] RE-EVAL   [x] KL(15447) x 1   [] IONTO  [] NMR (82466) x     [] VASO  [x] Manual (41245) x 1     [] Other:  [x] TA x  1    [] Mech Traction (13098)  [] ES(attended) (68408)      [] ES (un) (20612):       Mona Case stated goal: improve shoulder movement and strength     Therapist goals for Patient:   Short Term Goals: To be achieved in: 2 weeks  1. Independent in HEP and progression per patient tolerance, in order to prevent re-injury. []? Progressing: []? Met: []? Not Met: []? Adjusted   2. Patient will have a decrease in pain to facilitate improvement in movement, function, and ADLs as indicated by Functional Deficits. []? Progressing: []? Met: []? Not Met: []? Adjusted     Long Term Goals: To be achieved in:  4-5 months  1. Disability index score of 80% or more for the UEFI to assist with reaching prior level of function. []? Progressing: []? Met: []? Not Met: []? Adjusted  2.  Patient will demonstrate increased R shoulder AROM to  140 flex, 140 abd, and ER at 80 to 90 degrees to allow for proper joint functioning as indicated by patients Functional Deficits. []? Progressing: []? Met: []? Not Met: []? Adjusted  3. Patient will demonstrate an increase in R shoulder strength to 4/5 flex, abd, ER in UE to allow for proper functional mobility as indicated by patients Functional Deficits. []? Progressing: []? Met: []? Not Met: []? Adjusted  4. Patient will return to ADLs above shoulder height without increased symptoms or restriction. []? Progressing: []? Met: []? Not Met: []? Adjusted  5. Pt will return to getting dressed and self care without pain or limitations in R shoulder. []? Progressing: []? Met: []? Not Met: []? Adjusted       Overall Progression Towards Functional goals/ Treatment Progress Update:  [] Patient is progressing as expected towards functional goals listed. [] Progression is slowed due to complexities/Impairments listed. [] Progression has been slowed due to co-morbidities. [x] Plan just implemented, too soon to assess goals progression <30days   [] Goals require adjustment due to lack of progress  [] Patient is not progressing as expected and requires additional follow up with physician  [] Other    Prognosis for POC: [x] Good [] Fair  [] Poor      Patient requires continued skilled intervention: [x] Yes  [] No      ASSESSMENT:  See eval    Treatment/Activity Tolerance:  [x] Patient tolerated treatment well [] Patient limited by fatique  [] Patient limited by pain  [] Patient limited by other medical complications  [x] Other: pt did arrive more sore after feeling a click or pop in shoulder yesterday. He was able to complete all exercises today but was more tight and sore in ER PROM and sore with isometrics today. Did do gentle PROM of shoulder today and pt was guarded with this christian in ER. He was also tender to touch throughout anterior shoulder so did not do STM here today. He does see MD in two days. Pt was educated that if he is more sore tomorrow he may call Dr.s office to see if they can get him in any earlier.  Pt was instructed to allow MD know about pop and spasms in shoulder. He reports he has been having spasms in shoulder on/off since surgery. He will continue to be progressed in PT per post op protocol. Return to Play: (if applicable)   []  Stage 1: Intro to Strength   []  Stage 2: Return to Run and Strength   []  Stage 3: Return to Jump and Strength   []  Stage 4: Dynamic Strength and Agility   []  Stage 5: Sport Specific Training     []  Ready to Return to Play, Meets All Above Stages   []  Not Ready for Return to Sports   Comments:            Prognosis: [x] Good [] Fair  [] Poor    Patient Requires Follow-up: [x] Yes  [] No    PLAN: See eval; consider more isometrics, manual PROM/STM as needed, fu with MD appt  [x] Continue per plan of care [] Alter current plan (see comments above)  [] Plan of care initiated [] Hold pending MD visit [] Discharge    Note: If patient does not return for scheduled/ recommended follow up visits, this note will serve as a discharge from care along with most recent update on progress.      Electronically signed by: Leanne Maurer, PT PT, DPT

## 2022-03-17 ENCOUNTER — APPOINTMENT (OUTPATIENT)
Dept: PHYSICAL THERAPY | Age: 52
End: 2022-03-17
Payer: COMMERCIAL

## 2022-03-21 ENCOUNTER — HOSPITAL ENCOUNTER (OUTPATIENT)
Dept: PHYSICAL THERAPY | Age: 52
Setting detail: THERAPIES SERIES
Discharge: HOME | End: 2022-03-21
Payer: COMMERCIAL

## 2022-03-21 PROCEDURE — 97140 MANUAL THERAPY 1/> REGIONS: CPT | Performed by: PHYSICAL THERAPIST

## 2022-03-21 PROCEDURE — 97530 THERAPEUTIC ACTIVITIES: CPT | Performed by: PHYSICAL THERAPIST

## 2022-03-21 PROCEDURE — 97110 THERAPEUTIC EXERCISES: CPT | Performed by: PHYSICAL THERAPIST

## 2022-03-21 NOTE — PLAN OF CARE
[address] [facility],[address], [place]                                                     Physical Therapy Re-Certification Plan of Aicha Long      Dear  Dr. Christiano Ventura,    We had the pleasure of treating the following patient for physical therapy services at [address] [place] [address]. A summary of our findings can be found in the updated assessment below. This includes our plan of care. If you have any questions or concerns regarding these findings, please do not hesitate to contact me at the office phone number checked above. Thank you for the referral.     Physician Signature:________________________________Date:__________________  By signing above (or electronic signature), therapists plan is approved by physician    Total Visits to Date: 6  Overall Response to Treatment:   [x]Patient is responding well to treatment and improvement is noted with regards  to goals   []Patient should continue to improve in reasonable time if they continue HEP   []Patient has plateaued and is no longer responding to skilled PT intervention    []Patient is getting worse and would benefit from return to referring MD   []Patient unable to adhere to initial POC   [x]Other:  Pt continues to be tight in shoulder post op. However he did improve some on pulleys flex and ER stretching at side today compared to last visit. He continues to be tight and guarded with manual PROM. Did add in STM to pec area and along incision to help decrease tightness here also. Pt is weak in R shoulder demonstrated by poor AROM consistent with his time post op.  Pt will continue to be progressed in PT per post op protocol and per tolerance to improve R shoulder PROM, AROM and strength to improve function use of R UE        Physical Therapy Daily Treatment Note  Date:  3/21/2022    Patient Name:  Fabián Arrieta    :  1970  MRN: [id number] Medical/Treatment Diagnosis Information:  · Diagnosis: R59.705 (ICD-10-CM) - status post right shoulder total arthroplasty; DOS: 2/3/22  · Treatment Diagnosis: M25.511 (R) shoulder pain; M62.81 muscle weakness  Insurance/Certification information:  PT Insurance Information: [facility]- 20 visits, no auth  Physician Information:  Referring Practitioner: Walter Bergman MD  Has the plan of care been signed (Y/N):        [x]  Yes  []  No     Date of Patient follow up with Physician: around 3/18/22      Is this a Progress Report:     [x]  Yes  []  No        If Yes:  Date Range for reporting period:  Beginning- 2/21/2022   Ending- 3/21/22    Progress report will be due (10 Rx or 30 days whichever is less): 1/87/57       Recertification will be due (POC Duration  / 90 days whichever is less): as above        Visit # Insurance Allowable Auth Required   6 20 []  Yes [x]  No        Functional Scale: UEFI - 21.25%        Date assessed:      Latex Allergy:  [x]NO      []YES  Preferred Language for Healthcare:   [x]English       []other:      Pain level:  2-3/10 at rest;    on 3/21/2022    SUBJECTIVE:  Pt is 6.5 weeks post op. Pt saw PA with surgeons office. He no longer needs to wear sling unless he is out in big crowd. Pt was told popping may be scar tissue and as long as he is progressing in therapy to not be concerned with it. Pt states he still gets spasms in shoulder sometimes. He still has popping in shoulder when he moves his arm around but not in exercises. It is an uncomfortable but not painful pop. Pt states he had to cancel Thursdays appt due to having to help  baseball.      OBJECTIVE:    Observation:    Test measurements:      CERV ROM       Cervical Flexion       Cervical Extension       Cervical SB       Cervical rotation                      ROM PROM AROM- 3/21/22  Comment     L R-3/21/22 L R     Flexion    102 table slide; 114 pulleys  159  82     Abduction   100 scap table slide; 108 scap pulleys  092  44   ER at side    36 supine  88       ER at 90 abd    20 at 45 abd  80       BB IR      T9  posterior R hip     IR at 90 abd             Other             Other                    Strength- deferred due to recent surgery L R Comment   Flexion         Abduction         ER         IR         Supraspinatus         Upper Trap         Lower Trap         Mid Trap         Rhomboids         Biceps         Triceps         Horizontal Abduction         Horizontal Adduction         Lats            Orthopedic Special Tests: deferred  Special Tests Left Right   Apley Scratch IR:  ER:   Cross body: IR:  ER:  Cross Body:   Neer's       Full Can       Empty Can       Андрей Vishnu       Nerve Tension Testing       Speed's       Conti's        Spurling's       Repeated Scaption                        Reflexes/Sensation (myotomes/dermatomes):  able to sense light touch in hand and arm     Joint mobility: n/t              []?Normal                       []?Hypo              []?Hyper     Palpation: n/t     Functional Mobility/Transfers: Indep     Posture: arrived in sling without abd pillow     Bandages/Dressings/Incisions: covered with Pernio type dressing. Open area that has been draining at superior incision. No redness or rash noted.  MD/NP aware of drainage and said it was clear.        Gait: (include devices/WB status) Indep       RESTRICTIONS/PRECAUTIONS: HTN, OA, Dizziness/vertigo- mostly at night when he gets up; disabled due to his back    Exercises/Interventions:     Exercise/Equipment Resistance/Repetitions Other comments   Aerobic Conditioning     Aerodyne          Stretching/PROM     Cane ER Supine at side and 45 abd 10 x 10 secs  Limit to 30 deg   Wand flex     Towel press up     Passive shoulder flex with opposite UE assist     Ball on wall for shoulder flex     Elbow PROM     Side-lying flex     Table Slides 10 x 10 secs flex and scap Limit to 120 degrees   Wall slides      UE Luckey     Pulleys  10 x 10 secs flex and scap Limit to 120 degrees   Pendulum hold/forward bow     BB IR     SL IR     Pec doorway stretch     CBA stretch     UT stretch     LS stretch     Isometrics     Retraction 10 x 10 secs           Weight shift     Flexion 10 x 10 secs    Abduction 10 x 10 secs    External Rotation 10 x 10 secs    Internal Rotation     Biceps     Triceps          PRE's     Flexion     Abduction     External Rotation     Internal Rotation     Shrugs     EXT     Reverse Flys     Serratus 3 x 10 with cane assist    Horizontal Abd with ER     Biceps 3 x 10 2 lbs    Triceps     Retraction     Wrist flex/extn     Cable Column/Theraband     External Rotation     Internal Rotation     Shrugs     Lats     Ext     Flex     Scapular Retraction     BIC     TRIC 3 x 10 blue TB    PNF          Dynamic Stability          Plyoback     Manual tx:     STM R pec/anterior medial to shoulder and along incision x 4 mins    PROM X 8 mins flex, scap, and ER       Patient education: Pt was educated on PT diagnosis, prognosis, and plan of care. Pt was educated on the use of ice throughout the day. Pt was educated on signs/symptoms of infection and to call with any questions or concerns. Pt educated on dressing, precautions and restrictions. Therapeutic Exercise and NMR EXR  [x] (32479) Provided verbal/tactile cueing for activities related to strengthening, flexibility, endurance, ROM  for improvements in scapular, scapulothoracic and UE control with self care, reaching, carrying, lifting, house/yardwork, driving/computer work.    [] (31680) Provided verbal/tactile cueing for activities related to improving balance, coordination, kinesthetic sense, posture, motor skill, proprioception  to assist with  scapular, scapulothoracic and UE control with self care, reaching, carrying, lifting, house/yardwork, driving/computer work.     Therapeutic Activities:    [x] (32477 or 63446) Provided verbal/tactile cueing for activities related to improving balance, coordination, kinesthetic sense, posture, motor skill, proprioception and motor activation to allow for proper function of scapular, scapulothoracic and UE control with self care, carrying, lifting, driving/computer work. Home Exercise Program:    [x] (74248) Reviewed/Progressed HEP activities related to strengthening, flexibility, endurance, ROM of scapular, scapulothoracic and UE control with self care, reaching, carrying, lifting, house/yardwork, driving/computer work  [] (62967) Reviewed/Progressed HEP activities related to improving balance, coordination, kinesthetic sense, posture, motor skill, proprioception of scapular, scapulothoracic and UE control with self care, reaching, carrying, lifting, house/yardwork, driving/computer work    Access Code: [email or web address]  URL: [email or web address]  Date: 03/10/2022  Prepared by: Ruben Preston    Exercises  Seated Shoulder Flexion Towel Slide at Table Top - 2 x daily - 7 x weekly - 10 reps - 1 sets - 10 hold  Seated Shoulder Scaption Towel Slide at Table Top - 2 x daily - 7 x weekly - 10 reps - 1 sets - 10 hold  Seated Shoulder Flexion AAROM with Pulley Behind - 2 x daily - 7 x weekly - 10 reps - 1 sets - 10 hold  Seated Shoulder Scaption AAROM with Pulley at Side - 2 x daily - 7 x weekly - 10 reps - 1 sets - 10 hold  Seated Shoulder External Rotation AAROM with Cane and Hand in Neutral - 2 x daily - 7 x weekly - 10 reps - 1 sets - 10 hold  Supine Shoulder External Rotation with Dowel - 2 x daily - 7 x weekly - 10 reps - 1 sets - 10 hold  Supine Scapular Protraction in Flexion with Dumbbells - 1 x daily - 7 x weekly - 3 sets - 10 reps  Isometric Shoulder Flexion at Wall - 1 x daily - 7 x weekly - 1 sets - 10 reps - 10 hold  Isometric Shoulder Abduction at Wall - 1 x daily - 7 x weekly - 1 sets - 10 reps - 10 hold  Standing Bicep Curls Supinated with Dumbbells - 1 x daily - 7 x weekly - 3 sets - 10 reps  Standing Elbow Extension with Anchored Resistance - 1 x daily - 7 x weekly - 3 sets - 10 reps  Seated Scapular Retraction - 2 x daily - 7 x weekly - 10 reps - 10 hold  Seated Shoulder Shrugs - 2 x daily - 7 x weekly - 3 sets - 10 reps         Manual Treatments:  PROM / STM / Oscillations-Mobs:  G-I, II, III, IV (PA's, Inf., Post.)  [x] (01909) Provided manual therapy to mobilize soft tissue/joints of cervical/CT, scapular GHJ and UE for the purpose of modulating pain, promoting relaxation,  increasing ROM, reducing/eliminating soft tissue swelling/inflammation/restriction, improving soft tissue extensibility and allowing for proper ROM for normal function with self care, reaching, carrying, lifting, house/yardwork, driving/computer work    Modalities:      Charges:  Timed Code Treatment Minutes: 45   Total Treatment Minutes: 45     [] EVAL (LOW) 85429   [] EVAL (MOD) 39324   [] EVAL (HIGH) 41875   [] RE-EVAL   [x] ND(94642) x 1   [] IONTO  [] NMR (43876) x     [] VASO  [x] Manual (32694) x 1     [] Other:  [x] TA x  1    [] Mech Traction (27853)  [] ES(attended) (55930)      [] ES (un) (56291):       Shimon Marks stated goal: improve shoulder movement and strength     Therapist goals for Patient:   Short Term Goals: To be achieved in: 2 weeks  1. Independent in HEP and progression per patient tolerance, in order to prevent re-injury. []? Progressing: [x]? Met: []? Not Met: []? Adjusted   2. Patient will have a decrease in pain to facilitate improvement in movement, function, and ADLs as indicated by Functional Deficits. []? Progressing: [x]? Met: []? Not Met: []? Adjusted     Long Term Goals: To be achieved in:  4-5 months  1. Disability index score of 80% or more for the UEFI to assist with reaching prior level of function. [x]? Progressing: []? Met: [x]? Not Met: []? Adjusted  2.  Patient will demonstrate increased R shoulder AROM to  140 flex, 140 abd, and ER at 80 to 90 degrees to allow for proper joint functioning as indicated by patients Functional Deficits. [x]? Progressing: []? Met: [x]? Not Met: []? Adjusted  3. Patient will demonstrate an increase in R shoulder strength to 4/5 flex, abd, ER in UE to allow for proper functional mobility as indicated by patients Functional Deficits. [x]? Progressing: []? Met: [x]? Not Met: []? Adjusted  4. Patient will return to ADLs above shoulder height without increased symptoms or restriction. [x]? Progressing: []? Met: [x]? Not Met: []? Adjusted  5. Pt will return to getting dressed and self care without pain or limitations in R shoulder. [x]? Progressing: []? Met: [x]? Not Met: []? Adjusted       Overall Progression Towards Functional goals/ Treatment Progress Update:  [x] Patient is progressing as expected towards functional goals listed. [] Progression is slowed due to complexities/Impairments listed. [] Progression has been slowed due to co-morbidities. [] Plan just implemented, too soon to assess goals progression <30days   [] Goals require adjustment due to lack of progress  [] Patient is not progressing as expected and requires additional follow up with physician  [] Other    Prognosis for POC: [x] Good [] Fair  [] Poor      Patient requires continued skilled intervention: [x] Yes  [] No      ASSESSMENT:      Treatment/Activity Tolerance:  [x] Patient tolerated treatment well [] Patient limited by fatique  [] Patient limited by pain  [] Patient limited by other medical complications  [x] Other: Pt continues to be tight in shoulder post op. However he did improve some on pulleys flex and ER stretching at side today compared to last visit. He continues to be tight and guarded with manual PROM. Did add in STM to pec area and along incision to help decrease tightness here also. Pt is weak in R shoulder demonstrated by poor AROM consistent with his time post op.  Pt will continue to be progressed in PT per post op protocol and per tolerance to improve R shoulder PROM, AROM and strength to improve function use of R UE      Return to Play: (if applicable)   []  Stage 1: Intro to Strength   []  Stage 2: Return to Run and Strength   []  Stage 3: Return to Jump and Strength   []  Stage 4: Dynamic Strength and Agility   []  Stage 5: Sport Specific Training     []  Ready to Return to Play, Meets All Above Stages   []  Not Ready for Return to Sports   Comments:            Prognosis: [x] Good [] Fair  [] Poor    Patient Requires Follow-up: [x] Yes  [] No    PLAN: 2x/week for 6 more weeks (3/21/22); manual PROM/STM as needed; consider [facility] as able; UEFI  [x] Continue per plan of care [] Alter current plan (see comments above)  [] Plan of care initiated [] Hold pending MD visit [] Discharge    Note: If patient does not return for scheduled/ recommended follow up visits, this note will serve as a discharge from care along with most recent update on progress.      Electronically signed by: Mau Griffith PT, DPT

## 2022-03-24 ENCOUNTER — APPOINTMENT (OUTPATIENT)
Dept: PHYSICAL THERAPY | Age: 52
End: 2022-03-24
Payer: COMMERCIAL

## 2022-03-28 ENCOUNTER — HOSPITAL ENCOUNTER (OUTPATIENT)
Dept: PHYSICAL THERAPY | Age: 52
Setting detail: THERAPIES SERIES
Discharge: HOME | End: 2022-03-28
Payer: COMMERCIAL

## 2022-03-28 PROCEDURE — 97140 MANUAL THERAPY 1/> REGIONS: CPT | Performed by: PHYSICAL THERAPIST

## 2022-03-28 PROCEDURE — 97112 NEUROMUSCULAR REEDUCATION: CPT | Performed by: PHYSICAL THERAPIST

## 2022-03-28 PROCEDURE — 97110 THERAPEUTIC EXERCISES: CPT | Performed by: PHYSICAL THERAPIST

## 2022-03-28 NOTE — FLOWSHEET NOTE
[address] [facility]                                               Physical Therapy Daily Treatment Note  Date:  3/28/2022    Patient Name:  Vinay Pittman    :  1970  MRN: [id number]    Medical/Treatment Diagnosis Information:  · Diagnosis: E56.338 (ICD-10-CM) - status post right shoulder total arthroplasty; DOS: 2/3/22  · Treatment Diagnosis: M25.511 (R) shoulder pain; M62.81 muscle weakness  Insurance/Certification information:  PT Insurance Information: [facility]- 20 visits, no auth  Physician Information:  Referring Practitioner: Duong Howe MD  Has the plan of care been signed (Y/N):        [x]  Yes  []  No     Date of Patient follow up with Physician: around 3/18/22      Is this a Progress Report:     [x]  Yes  []  No        If Yes:  Date Range for reporting period:  Beginning- 2022   Ending- 3/21/22    Progress report will be due (10 Rx or 30 days whichever is less):        Recertification will be due (POC Duration  / 90 days whichever is less): as above        Visit # Insurance Allowable Auth Required   7 20 []  Yes [x]  No        Functional Scale: UEFI - 41.25%        Date assessed:  3/28/22    Latex Allergy:  [x]NO      []YES  Preferred Language for Healthcare:   [x]English       []other:      Pain level:  4/10 at rest;  2-3/10 at rest yesterday    on 3/28/2022    SUBJECTIVE:  Pt is 7.5 weeks post op. Pt has been doing exercises at home. He has been doing stretches up to 5x/day but strengthening just one time a day. He has been feeling better overall until last night he must have rolled onto R shoulder and woke up on this side. He woke up with a lot of pain 8/10. It has slowly gotten better throughout the day.       OBJECTIVE:    Observation:    Test measurements:      CERV ROM       Cervical Flexion       Cervical Extension       Cervical SB       Cervical rotation             ROM PROM AROM- 3/21/22  Comment     L R-3/28/22 L R     Flexion    105 table slide; 108 pulleys  159  82     Abduction   100 scap table slide; 109 scap pulleys  195  44     ER at side    32 supine  88       ER at 90 abd    25 at 45 abd  80       BB IR      T9  posterior R hip     IR at 90 abd             Other             Other                    Strength- deferred due to recent surgery L R Comment   Flexion         Abduction         ER         IR         Supraspinatus         Upper Trap         Lower Trap         Mid Trap         Rhomboids         Biceps         Triceps         Horizontal Abduction         Horizontal Adduction         Lats            Orthopedic Special Tests: deferred  Special Tests Left Right   Apley Scratch IR:  ER:   Cross body: IR:  ER:  Cross Body:   Neer's       Full Can       Empty Can       Seema Pollen       Nerve Tension Testing       Speed's       Conti's        Spurling's       Repeated Scaption                        Reflexes/Sensation (myotomes/dermatomes):  able to sense light touch in hand and arm     Joint mobility: n/t              []?Normal                       []?Hypo              []?Hyper     Palpation: n/t     Functional Mobility/Transfers: Indep     Posture: arrived in sling without abd pillow     Bandages/Dressings/Incisions: covered with Pernio type dressing. Open area that has been draining at superior incision. No redness or rash noted.  MD/NP aware of drainage and said it was clear.        Gait: (include devices/WB status) Indep       RESTRICTIONS/PRECAUTIONS: HTN, OA, Dizziness/vertigo- mostly at night when he gets up; disabled due to his back    Exercises/Interventions:     Exercise/Equipment Resistance/Repetitions Other comments   Aerobic Conditioning     Aerodyne          Stretching/PROM     Cane ER Supine at side and 45 abd 10 x 10 secs  Limit to 30 deg   Wand flex     Towel press up     Passive shoulder flex with opposite UE assist     Ball on wall for shoulder flex     Elbow PROM     Side-lying flex     Table Slides 10 x 10 secs flex and scap on incline bolster Limit to 120 degrees   Wall slides  10 x 5 secs    UE [place]     Pulleys  10 x 10 secs flex and scap Limit to 120 degrees   Pendulum hold/forward bow     BB IR     SL IR     Pec doorway stretch     CBA stretch     UT stretch     LS stretch     Isometrics     Retraction 10 x 10 secs           Weight shift     Flexion 10 x 10 secs    Abduction 10 x 10 secs    External Rotation 10 x 10 secs    Internal Rotation     Biceps     Triceps          PRE's     Flexion     Abduction     External Rotation     Internal Rotation     Shrugs     EXT     Reverse Flys     Serratus 3 x 10 with cane assist    Horizontal Abd with ER     Biceps 3 x 10 2 lbs    Triceps     Retraction     Wrist flex/extn     Cable Column/Theraband     External Rotation     Internal Rotation     Shrugs     Lats     Ext     Flex     Scapular Retraction     BIC     TRIC 3 x 10 blue TB    PNF     UK flex supine 1 min 20-30 deg arc    Dynamic Stability          Plyoback     Manual tx:     STM s    PROM X 8 mins flex, scap, and ER, IR with gentle distraction and joint mobs      Patient education: Pt was educated on PT diagnosis, prognosis, and plan of care. Pt was educated on the use of ice throughout the day. Pt was educated on signs/symptoms of infection and to call with any questions or concerns. Pt educated on dressing, precautions and restrictions.      Therapeutic Exercise and NMR EXR  [x] (70993) Provided verbal/tactile cueing for activities related to strengthening, flexibility, endurance, ROM  for improvements in scapular, scapulothoracic and UE control with self care, reaching, carrying, lifting, house/yardwork, driving/computer work.    [] (92042) Provided verbal/tactile cueing for activities related to improving balance, coordination, kinesthetic sense, posture, motor skill, proprioception  to assist with  scapular, scapulothoracic and UE control with self care, reaching, carrying, lifting, house/yardwork, driving/computer work. Therapeutic Activities:    [x] (20176 or 76795) Provided verbal/tactile cueing for activities related to improving balance, coordination, kinesthetic sense, posture, motor skill, proprioception and motor activation to allow for proper function of scapular, scapulothoracic and UE control with self care, carrying, lifting, driving/computer work. Home Exercise Program:    [x] (28166) Reviewed/Progressed HEP activities related to strengthening, flexibility, endurance, ROM of scapular, scapulothoracic and UE control with self care, reaching, carrying, lifting, house/yardwork, driving/computer work  [] (66875) Reviewed/Progressed HEP activities related to improving balance, coordination, kinesthetic sense, posture, motor skill, proprioception of scapular, scapulothoracic and UE control with self care, reaching, carrying, lifting, house/yardwork, driving/computer work    Access Code: [email or web address]  URL: [email or web address]  Date: 03/28/2022  Prepared by: Derian Aiken    Exercises  Seated Shoulder Flexion Towel Slide at Table Top - 2 x daily - 7 x weekly - 10 reps - 1 sets - 10 hold  Seated Shoulder Scaption Towel Slide at Table Top - 2 x daily - 7 x weekly - 10 reps - 1 sets - 10 hold  Seated Shoulder Flexion AAROM with Pulley Behind - 2 x daily - 7 x weekly - 10 reps - 1 sets - 10 hold  Seated Shoulder Scaption AAROM with Pulley at Side - 2 x daily - 7 x weekly - 10 reps - 1 sets - 10 hold  Shoulder Flexion Wall Slide with Towel - 2 x daily - 7 x weekly - 10 reps - 1 sets - 10 hold  Supine Shoulder External Rotation with Dowel - 2 x daily - 7 x weekly - 10 reps - 1 sets - 10 hold  Supine Shoulder External Rotation in 45 Degrees Abduction AAROM with Dowel - 2 x daily - 7 x weekly - 10 reps - 1 sets - 10 hold  Supine Scapular Protraction in Flexion with Dumbbells - 1 x daily - 7 x weekly - 3 sets - 10 reps  Isometric Shoulder Flexion at Wall - 1 x daily - 7 x weekly - 1 sets - 10 reps - 10 hold  Isometric Shoulder Abduction at Wall - 1 x daily - 7 x weekly - 1 sets - 10 reps - 10 hold  Standing Isometric Shoulder External Rotation with Doorway - 1 x daily - 7 x weekly - 1 sets - 10 reps - 10 hold  Standing Bicep Curls Supinated with Dumbbells - 1 x daily - 7 x weekly - 3 sets - 10 reps  Standing Elbow Extension with Anchored Resistance - 1 x daily - 7 x weekly - 3 sets - 10 reps  Seated Scapular Retraction - 2 x daily - 7 x weekly - 10 reps - 10 hold  Seated Shoulder Shrugs - 2 x daily - 7 x weekly - 3 sets - 10 reps  Supine Shoulder Flexion Extension Full Range AROM - 1 x daily - 7 x weekly - 1 reps - 1 sets       Manual Treatments:  PROM / STM / Oscillations-Mobs:  G-I, II, III, IV (PA's, Inf., Post.)  [x] (43412) Provided manual therapy to mobilize soft tissue/joints of cervical/CT, scapular GHJ and UE for the purpose of modulating pain, promoting relaxation,  increasing ROM, reducing/eliminating soft tissue swelling/inflammation/restriction, improving soft tissue extensibility and allowing for proper ROM for normal function with self care, reaching, carrying, lifting, house/yardwork, driving/computer work    Modalities:      Charges:  Timed Code Treatment Minutes: 46   Total Treatment Minutes: 50     [] EVAL (LOW) 96367   [] EVAL (MOD) 99536   [] EVAL (HIGH) 14261   [] RE-EVAL   [x] DC(75109) x 1   [] IONTO  [] NMR (57737) x     [] VASO  [x] Manual (82508) x 1     [] Other:  [x] TA x  1    [] Mech Traction (13758)  [] ES(attended) (52873)      [] ES (un) (25512):       Juan Munoz stated goal: improve shoulder movement and strength     Therapist goals for Patient:   Short Term Goals: To be achieved in: 2 weeks  1. Independent in HEP and progression per patient tolerance, in order to prevent re-injury. []? Progressing: [x]? Met: []? Not Met: []? Adjusted   2.  Patient will have a decrease in pain to facilitate improvement in movement, function, and ADLs as indicated by Functional Deficits. []? Progressing: [x]? Met: []? Not Met: []? Adjusted     Long Term Goals: To be achieved in:  4-5 months  1. Disability index score of 80% or more for the UEFI to assist with reaching prior level of function. [x]? Progressing: []? Met: [x]? Not Met: []? Adjusted  2. Patient will demonstrate increased R shoulder AROM to  140 flex, 140 abd, and ER at 80 to 90 degrees to allow for proper joint functioning as indicated by patients Functional Deficits. [x]? Progressing: []? Met: [x]? Not Met: []? Adjusted  3. Patient will demonstrate an increase in R shoulder strength to 4/5 flex, abd, ER in UE to allow for proper functional mobility as indicated by patients Functional Deficits. [x]? Progressing: []? Met: [x]? Not Met: []? Adjusted  4. Patient will return to ADLs above shoulder height without increased symptoms or restriction. [x]? Progressing: []? Met: [x]? Not Met: []? Adjusted  5. Pt will return to getting dressed and self care without pain or limitations in R shoulder. [x]? Progressing: []? Met: [x]? Not Met: []? Adjusted       Overall Progression Towards Functional goals/ Treatment Progress Update:  [x] Patient is progressing as expected towards functional goals listed. [] Progression is slowed due to complexities/Impairments listed. [] Progression has been slowed due to co-morbidities.   [] Plan just implemented, too soon to assess goals progression <30days   [] Goals require adjustment due to lack of progress  [] Patient is not progressing as expected and requires additional follow up with physician  [] Other    Prognosis for POC: [x] Good [] Fair  [] Poor      Patient requires continued skilled intervention: [x] Yes  [] No      ASSESSMENT:      Treatment/Activity Tolerance:  [x] Patient tolerated treatment well [] Patient limited by fatique  [] Patient limited by pain  [] Patient limited by other medical complications  [x] Other: pt continues to be tight in shoulder; he is tight and guarded with manual PROM. He did arrive more sore today from waking up on shoulder this morning. He was not able to improve in flex or scap motion. He was able to do some wall slides but was challenged by these and was able to do deltoid retraining starting with just one minute today. Pt will continue to be progressed in PT per post op protocol and per tolerance to improve R shoulder PROM, AROM and strength to improve function use of R UE      Return to Play: (if applicable)   []  Stage 1: Intro to Strength   []  Stage 2: Return to Run and Strength   []  Stage 3: Return to Jump and Strength   []  Stage 4: Dynamic Strength and Agility   []  Stage 5: Sport Specific Training     []  Ready to Return to Play, Meets All Above Stages   []  Not Ready for Return to Sports   Comments:            Prognosis: [x] Good [] Fair  [] Poor    Patient Requires Follow-up: [x] Yes  [] No    PLAN: 2x/week for 6 more weeks (3/21/22); manual PROM/STM as needed  [x] Continue per plan of care [] Alter current plan (see comments above)  [] Plan of care initiated [] Hold pending MD visit [] Discharge    Note: If patient does not return for scheduled/ recommended follow up visits, this note will serve as a discharge from care along with most recent update on progress.      Electronically signed by: Kelsy Bain, PT, DPT

## 2022-03-31 ENCOUNTER — APPOINTMENT (OUTPATIENT)
Dept: PHYSICAL THERAPY | Age: 52
End: 2022-03-31
Payer: COMMERCIAL

## 2022-04-04 ENCOUNTER — HOSPITAL ENCOUNTER (OUTPATIENT)
Dept: PHYSICAL THERAPY | Age: 52
Setting detail: THERAPIES SERIES
Discharge: HOME | End: 2022-04-04
Payer: COMMERCIAL

## 2022-04-04 NOTE — FLOWSHEET NOTE
[address]       Physical Therapy   Phone: [phone]   Fax: [phone]      Physical Therapy  Cancellation/No-show Note  Patient Name:  Agatha Bernardo  :  1970   Date:  2022  Cancelled visits to date: 2  No-shows to date: 0    For today's appointment patient:  [x]  Cancelled  []  Rescheduled appointment  []  No-show     Reason given by patient:  []  Patient ill  []  Conflicting appointment  []  No transportation    []  Conflict with work  []  No reason given  [x]  Other:     Comments:  Stuck at repair shop    Electronically signed by:  Chantelle Elizalde, PT, PT

## 2022-04-11 ENCOUNTER — HOSPITAL ENCOUNTER (OUTPATIENT)
Dept: PHYSICAL THERAPY | Age: 52
Setting detail: THERAPIES SERIES
Discharge: HOME | End: 2022-04-11
Payer: COMMERCIAL

## 2022-04-11 PROCEDURE — 97110 THERAPEUTIC EXERCISES: CPT | Performed by: PHYSICAL THERAPIST

## 2022-04-11 PROCEDURE — 97530 THERAPEUTIC ACTIVITIES: CPT | Performed by: PHYSICAL THERAPIST

## 2022-04-11 PROCEDURE — 97140 MANUAL THERAPY 1/> REGIONS: CPT | Performed by: PHYSICAL THERAPIST

## 2022-04-11 NOTE — FLOWSHEET NOTE
Cervical rotation                      ROM PROM AROM- 3/21/22  Comment     L R-3/28/22 L R     Flexion    105 table slide; 108 pulleys  159  82     Abduction   100 scap table slide; 109 scap pulleys  195  44     ER at side    32 supine  88       ER at 90 abd    25 at 45 abd  80       BB IR      T9  posterior R hip     IR at 90 abd             Other             Other                    Strength- deferred due to recent surgery L R Comment   Flexion         Abduction         ER         IR         Supraspinatus         Upper Trap         Lower Trap         Mid Trap         Rhomboids         Biceps         Triceps         Horizontal Abduction         Horizontal Adduction         Lats            Orthopedic Special Tests: deferred  Special Tests Left Right   Apley Scratch IR:  ER:   Cross body: IR:  ER:  Cross Body:   Neer's       Full Can       Empty Can       Jonathan Nan       Nerve Tension Testing       Speed's       Conti's        Spurling's       Repeated Scaption                        Reflexes/Sensation (myotomes/dermatomes):  able to sense light touch in hand and arm     Joint mobility: n/t              []?Normal                       []?Hypo              []?Hyper     Palpation: n/t     Functional Mobility/Transfers: Indep     Posture: arrived in sling without abd pillow     Bandages/Dressings/Incisions: covered with Pernio type dressing. Open area that has been draining at superior incision. No redness or rash noted.  MD/NP aware of drainage and said it was clear.        Gait: (include devices/WB status) Indep       RESTRICTIONS/PRECAUTIONS: HTN, OA, Dizziness/vertigo- mostly at night when he gets up; disabled due to his back    Exercises/Interventions:     Exercise/Equipment Resistance/Repetitions Other comments   Aerobic Conditioning     Aerodyne          Stretching/PROM     Cane ER Supine at side and 45 abd 10 x 10 secs     Wand flex     Towel press up     Passive shoulder flex with opposite UE assist Ball on wall for shoulder flex     Elbow PROM     Side-lying flex     Table Slides 10 x 10 secs flex and scap    Wall slides      UE [place]     Pulleys  10 x 10 secs flex and scap    Pendulum hold/forward bow     BB IR     SL IR 10 x 10 secs    Pec doorway stretch     CBA stretch     UT stretch     LS stretch     Isometrics     Retraction            Weight shift     Flexion    Abduction    External Rotation    Internal Rotation     Biceps     Triceps          PRE's     Flexion     Abduction     External Rotation     Internal Rotation     Shrugs     EXT     Reverse Flys     Serratus 3x 10 with cane assist    Horizontal Abd with ER     Biceps 3 x 10 3 lbs    Triceps     Retraction     Wrist flex/extn     Cable Column/Theraband     External Rotation     Internal Rotation     Shrugs     Lats     Ext     Flex     Scapular Retraction      BIC     TRIC 3 x 10 blue TB    PNF     UK flex supine     Dynamic Stability          Plyoback     Manual tx:     STM R pec/subscap, gentle scar massage, and posterior R shoulder  x 5 mins    PROM X 10 mins flex, scap, and ER, IR with gentle distraction and joint mobs      Patient education: Pt was educated on PT diagnosis, prognosis, and plan of care. Pt was educated on the use of ice throughout the day. Pt was educated on signs/symptoms of infection and to call with any questions or concerns. Pt educated on dressing, precautions and restrictions.      Therapeutic Exercise and NMR EXR  [x] (31154) Provided verbal/tactile cueing for activities related to strengthening, flexibility, endurance, ROM  for improvements in scapular, scapulothoracic and UE control with self care, reaching, carrying, lifting, house/yardwork, driving/computer work.    [] (73582) Provided verbal/tactile cueing for activities related to improving balance, coordination, kinesthetic sense, posture, motor skill, proprioception  to assist with  scapular, scapulothoracic and UE control with self care, reaching, carrying, lifting, house/yardwork, driving/computer work. Therapeutic Activities:    [x] (01742 or 64306) Provided verbal/tactile cueing for activities related to improving balance, coordination, kinesthetic sense, posture, motor skill, proprioception and motor activation to allow for proper function of scapular, scapulothoracic and UE control with self care, carrying, lifting, driving/computer work. Home Exercise Program:    [x] (78880) Reviewed/Progressed HEP activities related to strengthening, flexibility, endurance, ROM of scapular, scapulothoracic and UE control with self care, reaching, carrying, lifting, house/yardwork, driving/computer work  [] (82088) Reviewed/Progressed HEP activities related to improving balance, coordination, kinesthetic sense, posture, motor skill, proprioception of scapular, scapulothoracic and UE control with self care, reaching, carrying, lifting, house/yardwork, driving/computer work    Access Code: [email or web address]  URL: [email or web address]  Date: 03/28/2022  Prepared by: Mer Felipe    Exercises  Seated Shoulder Flexion Towel Slide at Table Top - 2 x daily - 7 x weekly - 10 reps - 1 sets - 10 hold  Seated Shoulder Scaption Towel Slide at Table Top - 2 x daily - 7 x weekly - 10 reps - 1 sets - 10 hold  Seated Shoulder Flexion AAROM with Pulley Behind - 2 x daily - 7 x weekly - 10 reps - 1 sets - 10 hold  Seated Shoulder Scaption AAROM with Pulley at Side - 2 x daily - 7 x weekly - 10 reps - 1 sets - 10 hold  Shoulder Flexion Wall Slide with Towel - 2 x daily - 7 x weekly - 10 reps - 1 sets - 10 hold  Supine Shoulder External Rotation with Dowel - 2 x daily - 7 x weekly - 10 reps - 1 sets - 10 hold  Supine Shoulder External Rotation in 45 Degrees Abduction AAROM with Dowel - 2 x daily - 7 x weekly - 10 reps - 1 sets - 10 hold  Supine Scapular Protraction in Flexion with Dumbbells - 1 x daily - 7 x weekly - 3 sets - 10 reps  Isometric Shoulder Flexion at Wall - 1 x daily - 7 x weekly - 1 sets - 10 reps - 10 hold  Isometric Shoulder Abduction at Wall - 1 x daily - 7 x weekly - 1 sets - 10 reps - 10 hold  Standing Isometric Shoulder External Rotation with Doorway - 1 x daily - 7 x weekly - 1 sets - 10 reps - 10 hold  Standing Bicep Curls Supinated with Dumbbells - 1 x daily - 7 x weekly - 3 sets - 10 reps  Standing Elbow Extension with Anchored Resistance - 1 x daily - 7 x weekly - 3 sets - 10 reps  Seated Scapular Retraction - 2 x daily - 7 x weekly - 10 reps - 10 hold  Seated Shoulder Shrugs - 2 x daily - 7 x weekly - 3 sets - 10 reps  Supine Shoulder Flexion Extension Full Range AROM - 1 x daily - 7 x weekly - 1 reps - 1 sets       Manual Treatments:  PROM / STM / Oscillations-Mobs:  G-I, II, III, IV (PA's, Inf., Post.)  [x] (71887) Provided manual therapy to mobilize soft tissue/joints of cervical/CT, scapular GHJ and UE for the purpose of modulating pain, promoting relaxation,  increasing ROM, reducing/eliminating soft tissue swelling/inflammation/restriction, improving soft tissue extensibility and allowing for proper ROM for normal function with self care, reaching, carrying, lifting, house/yardwork, driving/computer work    Modalities:      Charges:  Timed Code Treatment Minutes: 43   Total Treatment Minutes: 43     [] EVAL (LOW) 13541   [] EVAL (MOD) 45856   [] EVAL (HIGH) 90474   [] RE-EVAL   [x] AI(95014) x 1   [] IONTO  [] NMR (95593) x     [] VASO  [x] Manual (43759) x 1     [] Other:  [x] TA x  1    [] Mech Traction (26559)  [] ES(attended) (67661)      [] ES (un) (27691):       Robert Brewster stated goal: improve shoulder movement and strength     Therapist goals for Patient:   Short Term Goals: To be achieved in: 2 weeks  1. Independent in HEP and progression per patient tolerance, in order to prevent re-injury. []? Progressing: [x]? Met: []? Not Met: []? Adjusted   2.  Patient will have a decrease in pain to facilitate improvement in movement, function, and ADLs as indicated by Functional Deficits. []? Progressing: [x]? Met: []? Not Met: []? Adjusted     Long Term Goals: To be achieved in:  4-5 months  1. Disability index score of 80% or more for the UEFI to assist with reaching prior level of function. [x]? Progressing: []? Met: [x]? Not Met: []? Adjusted  2. Patient will demonstrate increased R shoulder AROM to  140 flex, 140 abd, and ER at 80 to 90 degrees to allow for proper joint functioning as indicated by patients Functional Deficits. [x]? Progressing: []? Met: [x]? Not Met: []? Adjusted  3. Patient will demonstrate an increase in R shoulder strength to 4/5 flex, abd, ER in UE to allow for proper functional mobility as indicated by patients Functional Deficits. [x]? Progressing: []? Met: [x]? Not Met: []? Adjusted  4. Patient will return to ADLs above shoulder height without increased symptoms or restriction. [x]? Progressing: []? Met: [x]? Not Met: []? Adjusted  5. Pt will return to getting dressed and self care without pain or limitations in R shoulder. [x]? Progressing: []? Met: [x]? Not Met: []? Adjusted       Overall Progression Towards Functional goals/ Treatment Progress Update:  [x] Patient is progressing as expected towards functional goals listed. [] Progression is slowed due to complexities/Impairments listed. [] Progression has been slowed due to co-morbidities.   [] Plan just implemented, too soon to assess goals progression <30days   [] Goals require adjustment due to lack of progress  [] Patient is not progressing as expected and requires additional follow up with physician  [] Other    Prognosis for POC: [x] Good [] Fair  [] Poor      Patient requires continued skilled intervention: [x] Yes  [] No      ASSESSMENT:      Treatment/Activity Tolerance:  [x] Patient tolerated treatment well [] Patient limited by fatique  [] Patient limited by pain  [] Patient limited by other medical complications  [x] Other: pt unfortunately irritated shoulder last week where his arm went into ER by his grand-daughter falling onto it. He continues to be sore and guarded since then. Did focus on stretches and manual PROM today due to his increased tightness and pain. He continues to be tight and guarded with his PROM. He did have more difficulty with active shoulder elevation today due to pain. Did hold off on shoulder strengthening today due to this. He will do stretches tomorrow and follow up with MD the next day. Pt will continue to be progressed in PT per post op protocol and per tolerance to improve R shoulder PROM, AROM and strength to improve function use of R UE      Return to Play: (if applicable)   []  Stage 1: Intro to Strength   []  Stage 2: Return to Run and Strength   []  Stage 3: Return to Jump and Strength   []  Stage 4: Dynamic Strength and Agility   []  Stage 5: Sport Specific Training     []  Ready to Return to Play, Meets All Above Stages   []  Not Ready for Return to Sports   Comments:            Prognosis: [x] Good [] Fair  [] Poor    Patient Requires Follow-up: [x] Yes  [] No    PLAN: 2x/week for 6 more weeks (3/21/22); manual PROM/STM as needed; fu with MD appt, TB scap ret and BB IR when able. [x] Continue per plan of care [] Alter current plan (see comments above)  [] Plan of care initiated [] Hold pending MD visit [] Discharge    Note: If patient does not return for scheduled/ recommended follow up visits, this note will serve as a discharge from care along with most recent update on progress.      Electronically signed by: Betsy Andrews, PT, DPT

## 2022-04-18 ENCOUNTER — HOSPITAL ENCOUNTER (OUTPATIENT)
Dept: PHYSICAL THERAPY | Age: 52
Setting detail: THERAPIES SERIES
Discharge: HOME | End: 2022-04-18
Payer: COMMERCIAL

## 2022-04-18 PROCEDURE — 97140 MANUAL THERAPY 1/> REGIONS: CPT | Performed by: PHYSICAL THERAPIST

## 2022-04-18 PROCEDURE — 97110 THERAPEUTIC EXERCISES: CPT | Performed by: PHYSICAL THERAPIST

## 2022-04-18 NOTE — FLOWSHEET NOTE
[address] [facility], [place]                                               Physical Therapy Daily Treatment Note  Date:  2022    Patient Name:  Marlee Crews    :  1970  MRN: [id number]    Medical/Treatment Diagnosis Information:  · Diagnosis: P28.087 (ICD-10-CM) - status post right shoulder total arthroplasty; DOS: 2/3/22  · Treatment Diagnosis: M25.511 (R) shoulder pain; M62.81 muscle weakness  Insurance/Certification information:  PT Insurance Information: [facility]- 20 visits, no auth  Physician Information:  Referring Practitioner: David Martinez MD  Has the plan of care been signed (Y/N):        [x]  Yes  []  No     Date of Patient follow up with Physician: 2-6 weeks      Is this a Progress Report:     [x]  Yes  []  No        If Yes:  Date Range for reporting period:  Beginning- 2022   Ending- 3/21/22    Progress report will be due (10 Rx or 30 days whichever is less):        Recertification will be due (POC Duration  / 90 days whichever is less): as above        Visit # Insurance Allowable Auth Required   8 20 []  Yes [x]  No        Functional Scale: UEFI - 41.25%        Date assessed:  3/28/22    Latex Allergy:  [x]NO      []YES  Preferred Language for Healthcare:   [x]English       []other:      Pain level:  3.5/10 at rest;     on 2022    SUBJECTIVE:  Pt is 10.5 weeks post op. Pt 10 mins late to appt. Pt saw MD and took x-rays. X-rays were normal. He was told he may have torn some muscles or fibers in shoulders and to take it easy. He was told to continue PT but focus on gentle stretching and not strengthening initially due to more pain since last week. Do light stretches for a couple weeks. He feels slightly better. He is still limited in R arm elevation- has less than before the incident. He did not do exercises for a couple days as instructed and then returned to doing them again.  He says his incision is no longer draining- stopped abou two weeks after first visit.      OBJECTIVE:    Observation:    Test measurements:      CERV ROM       Cervical Flexion       Cervical Extension       Cervical SB       Cervical rotation                      ROM PROM AROM- 3/21/22  Comment     L R-4/18/22 L R     Flexion    ; 109 pulleys  159  82     Abduction    110 scap pulleys  195  44     ER at side    38 supine  88       ER at 90 abd    32 at 45 abd  80       BB IR      T9  posterior R hip     IR at 90 abd    50 at 60 abd         Other             Other                    Strength- deferred due to recent surgery L R Comment   Flexion         Abduction         ER         IR         Supraspinatus         Upper Trap         Lower Trap         Mid Trap         Rhomboids         Biceps         Triceps         Horizontal Abduction         Horizontal Adduction         Lats            Orthopedic Special Tests: deferred  Special Tests Left Right   Apley Scratch IR:  ER:   Cross body: IR:  ER:  Cross Body:   Neer's       Full Can       Empty Can       Camilo Woodstock       Nerve Tension Testing       Speed's       Conti's        Spurling's       Repeated Scaption                        Reflexes/Sensation (myotomes/dermatomes):  able to sense light touch in hand and arm     Joint mobility: n/t              []?Normal                       []?Hypo              []?Hyper     Palpation: n/t     Functional Mobility/Transfers: Indep     Posture: arrived in sling without abd pillow     Bandages/Dressings/Incisions:       Gait: (include devices/WB status) Indep       RESTRICTIONS/PRECAUTIONS: HTN, OA, Dizziness/vertigo- mostly at night when he gets up; disabled due to his back    Exercises/Interventions:     Exercise/Equipment Resistance/Repetitions Other comments   Aerobic Conditioning     Aerodyne          Stretching/PROM     Cane ER Supine at side and 45 abd 10 x 10 secs     Wand flex     Towel press up     Passive shoulder flex with opposite UE assist     Ball on wall for shoulder flex     Elbow PROM     Side-lying flex     Table Slides 10 x 10 secs flex and scap    Wall slides      UE [place]     Pulleys  10 x 10 secs flex and scap    Pendulum hold/forward bow     BB IR     SL IR 10 x 10 secs    Pec doorway stretch     CBA stretch     UT stretch     LS stretch     Isometrics     Retraction            Weight shift     Flexion    Abduction    External Rotation    Internal Rotation     Biceps     Triceps          PRE's     Flexion     Abduction     External Rotation     Internal Rotation     Shrugs     EXT     Reverse Flys     Serratus    Horizontal Abd with ER    Biceps    Triceps     Retraction     Wrist flex/extn     Cable Column/Theraband     External Rotation     Internal Rotation     Shrugs     Lats     Ext     Flex     Scapular Retraction      BIC     TRIC     PNF     UK flex supine     Dynamic Stability          Plyoback     Manual tx:     STM R pec/subscap, gentle scar massage, and posterior R shoulder  x 3 mins    PROM X 10 mins flex, scap, and ER, IR with gentle distraction and joint mobs      Patient education: Pt was educated on PT diagnosis, prognosis, and plan of care. Pt was educated on the use of ice throughout the day. Pt was educated on signs/symptoms of infection and to call with any questions or concerns. Pt educated on dressing, precautions and restrictions.      Therapeutic Exercise and NMR EXR  [x] (47198) Provided verbal/tactile cueing for activities related to strengthening, flexibility, endurance, ROM  for improvements in scapular, scapulothoracic and UE control with self care, reaching, carrying, lifting, house/yardwork, driving/computer work.    [] (28276) Provided verbal/tactile cueing for activities related to improving balance, coordination, kinesthetic sense, posture, motor skill, proprioception  to assist with  scapular, scapulothoracic and UE control with self care, reaching, carrying, lifting, house/yardwork, driving/computer work. Therapeutic Activities:    [x] (87716 or 29234) Provided verbal/tactile cueing for activities related to improving balance, coordination, kinesthetic sense, posture, motor skill, proprioception and motor activation to allow for proper function of scapular, scapulothoracic and UE control with self care, carrying, lifting, driving/computer work. Home Exercise Program:    [x] (61965) Reviewed/Progressed HEP activities related to strengthening, flexibility, endurance, ROM of scapular, scapulothoracic and UE control with self care, reaching, carrying, lifting, house/yardwork, driving/computer work  [] (50471) Reviewed/Progressed HEP activities related to improving balance, coordination, kinesthetic sense, posture, motor skill, proprioception of scapular, scapulothoracic and UE control with self care, reaching, carrying, lifting, house/yardwork, driving/computer work    Access Code: [email or web address]  URL: [email or web address]  Date: 03/28/2022  Prepared by: Mer Felipe    Exercises  Seated Shoulder Flexion Towel Slide at Table Top - 2 x daily - 7 x weekly - 10 reps - 1 sets - 10 hold  Seated Shoulder Scaption Towel Slide at Table Top - 2 x daily - 7 x weekly - 10 reps - 1 sets - 10 hold  Seated Shoulder Flexion AAROM with Pulley Behind - 2 x daily - 7 x weekly - 10 reps - 1 sets - 10 hold  Seated Shoulder Scaption AAROM with Pulley at Side - 2 x daily - 7 x weekly - 10 reps - 1 sets - 10 hold  Shoulder Flexion Wall Slide with Towel - 2 x daily - 7 x weekly - 10 reps - 1 sets - 10 hold  Supine Shoulder External Rotation with Dowel - 2 x daily - 7 x weekly - 10 reps - 1 sets - 10 hold  Supine Shoulder External Rotation in 45 Degrees Abduction AAROM with Dowel - 2 x daily - 7 x weekly - 10 reps - 1 sets - 10 hold  Supine Scapular Protraction in Flexion with Dumbbells - 1 x daily - 7 x weekly - 3 sets - 10 reps  Isometric Shoulder Flexion at Wall - 1 x daily - 7 x weekly - 1 sets - 10 reps - 10 hold  Isometric Shoulder Abduction at Wall - 1 x daily - 7 x weekly - 1 sets - 10 reps - 10 hold  Standing Isometric Shoulder External Rotation with Doorway - 1 x daily - 7 x weekly - 1 sets - 10 reps - 10 hold  Standing Bicep Curls Supinated with Dumbbells - 1 x daily - 7 x weekly - 3 sets - 10 reps  Standing Elbow Extension with Anchored Resistance - 1 x daily - 7 x weekly - 3 sets - 10 reps  Seated Scapular Retraction - 2 x daily - 7 x weekly - 10 reps - 10 hold  Seated Shoulder Shrugs - 2 x daily - 7 x weekly - 3 sets - 10 reps  Supine Shoulder Flexion Extension Full Range AROM - 1 x daily - 7 x weekly - 1 reps - 1 sets       Manual Treatments:  PROM / STM / Oscillations-Mobs:  G-I, II, III, IV (PA's, Inf., Post.)  [x] (22592) Provided manual therapy to mobilize soft tissue/joints of cervical/CT, scapular GHJ and UE for the purpose of modulating pain, promoting relaxation,  increasing ROM, reducing/eliminating soft tissue swelling/inflammation/restriction, improving soft tissue extensibility and allowing for proper ROM for normal function with self care, reaching, carrying, lifting, house/yardwork, driving/computer work    Modalities:      Charges:  Timed Code Treatment Minutes: 35   Total Treatment Minutes: 35     [] EVAL (LOW) 93284   [] EVAL (MOD) 82765   [] EVAL (HIGH) 78821   [] RE-EVAL   [x] ES(70897) x 1   [] IONTO  [] NMR (30797) x     [] VASO  [x] Manual (13365) x 1     [] Other:  [] TA x      [] Mech Traction (48240)  [] ES(attended) (86005)      [] ES (un) (60022):       Kevin Serra stated goal: improve shoulder movement and strength     Therapist goals for Patient:   Short Term Goals: To be achieved in: 2 weeks  1. Independent in HEP and progression per patient tolerance, in order to prevent re-injury. []? Progressing: [x]? Met: []? Not Met: []? Adjusted   2.  Patient will have a decrease in pain to facilitate improvement in movement, function, and ADLs as indicated by Functional Deficits. []? Progressing: [x]? Met: []? Not Met: []? Adjusted     Long Term Goals: To be achieved in:  4-5 months  1. Disability index score of 80% or more for the UEFI to assist with reaching prior level of function. [x]? Progressing: []? Met: [x]? Not Met: []? Adjusted  2. Patient will demonstrate increased R shoulder AROM to  140 flex, 140 abd, and ER at 80 to 90 degrees to allow for proper joint functioning as indicated by patients Functional Deficits. [x]? Progressing: []? Met: [x]? Not Met: []? Adjusted  3. Patient will demonstrate an increase in R shoulder strength to 4/5 flex, abd, ER in UE to allow for proper functional mobility as indicated by patients Functional Deficits. [x]? Progressing: []? Met: [x]? Not Met: []? Adjusted  4. Patient will return to ADLs above shoulder height without increased symptoms or restriction. [x]? Progressing: []? Met: [x]? Not Met: []? Adjusted  5. Pt will return to getting dressed and self care without pain or limitations in R shoulder. [x]? Progressing: []? Met: [x]? Not Met: []? Adjusted       Overall Progression Towards Functional goals/ Treatment Progress Update:  [x] Patient is progressing as expected towards functional goals listed. [] Progression is slowed due to complexities/Impairments listed. [] Progression has been slowed due to co-morbidities.   [] Plan just implemented, too soon to assess goals progression <30days   [] Goals require adjustment due to lack of progress  [] Patient is not progressing as expected and requires additional follow up with physician  [] Other    Prognosis for POC: [x] Good [] Fair  [] Poor      Patient requires continued skilled intervention: [x] Yes  [] No      ASSESSMENT:      Treatment/Activity Tolerance:  [x] Patient tolerated treatment well [] Patient limited by fatique  [] Patient limited by pain  [] Patient limited by other medical complications  [x] Other: did focus on stretches today still due to MD instruction and pt still being more sore. He did improve slightly with flex, scap and ER motion compared to before he injured his shoulder last week. He still has difficulty with active elevation with limit to about 40 -50 degrees. He will continue with his stretches and scar massage at home. Pt will continue to be progressed in PT per post op protocol and per tolerance to improve R shoulder PROM, AROM and strength to improve function use of R UE      Return to Play: (if applicable)   []  Stage 1: Intro to Strength   []  Stage 2: Return to Run and Strength   []  Stage 3: Return to Jump and Strength   []  Stage 4: Dynamic Strength and Agility   []  Stage 5: Sport Specific Training     []  Ready to Return to Play, Meets All Above Stages   []  Not Ready for Return to Sports   Comments:            Prognosis: [x] Good [] Fair  [] Poor    Patient Requires Follow-up: [x] Yes  [] No    PLAN: 2x/week for 6 more weeks (3/21/22); manual PROM/STM, BB IR when able, return to isos when able  [x] Continue per plan of care [] Alter current plan (see comments above)  [] Plan of care initiated [] Hold pending MD visit [] Discharge    Note: If patient does not return for scheduled/ recommended follow up visits, this note will serve as a discharge from care along with most recent update on progress.      Electronically signed by: Israel Adnrade, PT, DPT

## 2022-04-25 ENCOUNTER — HOSPITAL ENCOUNTER (OUTPATIENT)
Dept: PHYSICAL THERAPY | Age: 52
Setting detail: THERAPIES SERIES
Discharge: HOME | End: 2022-04-25
Payer: COMMERCIAL

## 2022-04-25 PROCEDURE — 97110 THERAPEUTIC EXERCISES: CPT | Performed by: PHYSICAL THERAPIST

## 2022-04-25 PROCEDURE — 97530 THERAPEUTIC ACTIVITIES: CPT | Performed by: PHYSICAL THERAPIST

## 2022-04-25 PROCEDURE — 97140 MANUAL THERAPY 1/> REGIONS: CPT | Performed by: PHYSICAL THERAPIST

## 2022-04-25 NOTE — PLAN OF CARE
[address] [facility],[address], [place]  Physical Therapy Re-Certification Plan of Rekha Mayer      Dear  Dr. Annia Eckert,    We had the pleasure of treating the following patient for physical therapy services at [address] [place] [address]. A summary of our findings can be found in the updated assessment below. This includes our plan of care. If you have any questions or concerns regarding these findings, please do not hesitate to contact me at the office phone number checked above. Thank you for the referral.     Physician Signature:________________________________Date:__________________  By signing above (or electronic signature), therapists plan is approved by physician    Total Visits to Date: 9  Overall Response to Treatment:   [x]Patient is responding well to treatment and improvement is noted with regards  to goals   []Patient should continue to improve in reasonable time if they continue HEP   []Patient has plateaued and is no longer responding to skilled PT intervention    []Patient is getting worse and would benefit from return to referring MD   []Patient unable to adhere to initial POC   [x]Other:   Pt did have set back a couple weeks ago with catching his granddaughter but pt is slowly improving in pain and motion since this. He did make good improvements in shoulder PROM/AAROM today compared to last week. However he continues to have tightness throughout his shoulder even with manual PROM. Continued to hold off BB IR stretching and strengthening today since pt is still sore in shoulder and so that pt can continue to focus on motion for now. He does have less AROM shoulder flex compared to before incident but does have improved AROM shoulder abd.  Pt will continue to be progressed in PT per post op protocol and per tolerance to improve R shoulder PROM, AROM and strength to improve functional use of R UE for ADLs                                                  Physical Therapy Daily Treatment Note  Date:  2022    Patient Name:  Pardeep Ba    :  1970  MRN: [id number]    Medical/Treatment Diagnosis Information:  · Diagnosis: T44.817 (ICD-10-CM) - status post right shoulder total arthroplasty; DOS: 2/3/22  · Treatment Diagnosis: M25.511 (R) shoulder pain; M62.81 muscle weakness  Insurance/Certification information:  PT Insurance Information: [facility]- 20 visits, no auth  Physician Information:  Referring Practitioner: Brandie Beasley MD  Has the plan of care been signed (Y/N):        [x]  Yes  []  No     Date of Patient follow up with Physician: 2-6 weeks      Is this a Progress Report:     [x]  Yes  []  No        If Yes:  Date Range for reporting period:  Beginning- 2022   Ending- 22    Progress report will be due (10 Rx or 30 days whichever is less):        Recertification will be due (POC Duration  / 90 days whichever is less): as above        Visit # Insurance Allowable Auth Required   9 20 []  Yes [x]  No        Functional Scale: UEFI - 55%        Date assessed:  22    Latex Allergy:  [x]NO      []YES  Preferred Language for Healthcare:   [x]English       []other:      Pain level:   3/10 at rest;     on 2022    SUBJECTIVE:  Pt is 11.5 weeks post op. Pt does feel like he is getting better and less sore than he was 2 weeks ago. He feels like his motion is improving since last visit. He feel like he is able to lift his arm further than last week but not as far as before.      OBJECTIVE:    Observation:    Test measurements:      CERV ROM       Cervical Flexion       Cervical Extension       Cervical SB       Cervical rotation                      ROM PROM AROM-   Comment     L R-22 L R-22     Flexion    ; 111 pulleys  159  69     Abduction    114 scap pulleys  195  82     ER at side    49 supine  88       ER at 90 abd    50 at 60 abd  80       BB IR      T9  posterior R hip     IR at 90 abd             Other             Other                    Strength- deferred due to recent injury L R Comment   Flexion         Abduction         ER         IR         Supraspinatus         Upper Trap         Lower Trap         Mid Trap         Rhomboids         Biceps         Triceps         Horizontal Abduction         Horizontal Adduction         Lats            Orthopedic Special Tests: deferred  Special Tests Left Right   Apley Scratch IR:  ER:   Cross body: IR:  ER:  Cross Body:   Neer's       Full Can       Empty Can       Katty        Nerve Tension Testing       Speed's       Conti's        Spurling's       Repeated Scaption                        Reflexes/Sensation (myotomes/dermatomes):  able to sense light touch in hand and arm     Joint mobility: n/t              []?Normal                       []?Hypo              []?Hyper     Palpation: n/t     Functional Mobility/Transfers: Indep     Posture: arrived in sling without abd pillow     Bandages/Dressings/Incisions:       Gait: (include devices/WB status) Indep       RESTRICTIONS/PRECAUTIONS: HTN, OA, Dizziness/vertigo- mostly at night when he gets up; disabled due to his back    Exercises/Interventions:     Exercise/Equipment Resistance/Repetitions Other comments   Aerobic Conditioning     Aerodyne          Stretching/PROM     Cane ER Supine at side and 45 abd 10 x 10 secs     Wand flex     Towel press up     Passive shoulder flex with opposite UE assist     Ball on wall for shoulder flex     Elbow PROM     Side-lying flex     Table Slides 10 x 10 secs flex and scap    Wall slides  5 x 5-10 secs flex    UE [place]     Pulleys  10 x 10 secs flex and scap    Pendulum hold/forward bow     BB IR     SL IR 10 x 10 secs    Pec doorway stretch     CBA stretch     UT stretch     LS stretch     Isometrics     Retraction            Weight shift     Flexion    Abduction    External Rotation    Internal Rotation     Biceps Triceps          PRE's     Flexion     Abduction     External Rotation     Internal Rotation     Shrugs     EXT     Reverse Flys     Serratus 3x 10 with cane assist   Horizontal Abd with ER    Biceps    Triceps     Retraction     Wrist flex/extn     Cable Column/Theraband     External Rotation     Internal Rotation     Shrugs     Lats     Ext     Flex     Scapular Retraction      BIC     TRIC     PNF     UK flex supine     Dynamic Stability          Plyoback     Manual tx:     STM R pec/subscap, gentle scar massage, and posterior R shoulder/RC  x 3 mins    PROM X 10 mins flex, scap, and ER, IR with gentle distraction and joint mobs      Patient education: Pt was educated on PT diagnosis, prognosis, and plan of care. Pt was educated on the use of ice throughout the day. Pt was educated on signs/symptoms of infection and to call with any questions or concerns. Pt educated on dressing, precautions and restrictions. Therapeutic Exercise and NMR EXR  [x] (96806) Provided verbal/tactile cueing for activities related to strengthening, flexibility, endurance, ROM  for improvements in scapular, scapulothoracic and UE control with self care, reaching, carrying, lifting, house/yardwork, driving/computer work.    [] (70503) Provided verbal/tactile cueing for activities related to improving balance, coordination, kinesthetic sense, posture, motor skill, proprioception  to assist with  scapular, scapulothoracic and UE control with self care, reaching, carrying, lifting, house/yardwork, driving/computer work. Therapeutic Activities:    [x] (41499 or 74772) Provided verbal/tactile cueing for activities related to improving balance, coordination, kinesthetic sense, posture, motor skill, proprioception and motor activation to allow for proper function of scapular, scapulothoracic and UE control with self care, carrying, lifting, driving/computer work.      Home Exercise Program:    [x] (85777) Reviewed/Progressed HEP activities related to strengthening, flexibility, endurance, ROM of scapular, scapulothoracic and UE control with self care, reaching, carrying, lifting, house/yardwork, driving/computer work  [] (33281) Reviewed/Progressed HEP activities related to improving balance, coordination, kinesthetic sense, posture, motor skill, proprioception of scapular, scapulothoracic and UE control with self care, reaching, carrying, lifting, house/yardwork, driving/computer work    Access Code: [email or web address]  URL: [email or web address]  Date: 03/28/2022  Prepared by: Mehrdad Dong    Exercises  Seated Shoulder Flexion Towel Slide at Table Top - 2 x daily - 7 x weekly - 10 reps - 1 sets - 10 hold  Seated Shoulder Scaption Towel Slide at Table Top - 2 x daily - 7 x weekly - 10 reps - 1 sets - 10 hold  Seated Shoulder Flexion AAROM with Pulley Behind - 2 x daily - 7 x weekly - 10 reps - 1 sets - 10 hold  Seated Shoulder Scaption AAROM with Pulley at Side - 2 x daily - 7 x weekly - 10 reps - 1 sets - 10 hold  Shoulder Flexion Wall Slide with Towel - 2 x daily - 7 x weekly - 10 reps - 1 sets - 10 hold  Supine Shoulder External Rotation with Dowel - 2 x daily - 7 x weekly - 10 reps - 1 sets - 10 hold  Supine Shoulder External Rotation in 45 Degrees Abduction AAROM with Dowel - 2 x daily - 7 x weekly - 10 reps - 1 sets - 10 hold  Supine Scapular Protraction in Flexion with Dumbbells - 1 x daily - 7 x weekly - 3 sets - 10 reps  Isometric Shoulder Flexion at Wall - 1 x daily - 7 x weekly - 1 sets - 10 reps - 10 hold  Isometric Shoulder Abduction at Wall - 1 x daily - 7 x weekly - 1 sets - 10 reps - 10 hold  Standing Isometric Shoulder External Rotation with Doorway - 1 x daily - 7 x weekly - 1 sets - 10 reps - 10 hold  Standing Bicep Curls Supinated with Dumbbells - 1 x daily - 7 x weekly - 3 sets - 10 reps  Standing Elbow Extension with Anchored Resistance - 1 x daily - 7 x weekly - 3 sets - 10 reps  Seated Scapular Retraction - 2 x daily - 7 x weekly - 10 reps - 10 hold  Seated Shoulder Shrugs - 2 x daily - 7 x weekly - 3 sets - 10 reps  Supine Shoulder Flexion Extension Full Range AROM - 1 x daily - 7 x weekly - 1 reps - 1 sets       Manual Treatments:  PROM / STM / Oscillations-Mobs:  G-I, II, III, IV (PA's, Inf., Post.)  [x] (41120) Provided manual therapy to mobilize soft tissue/joints of cervical/CT, scapular GHJ and UE for the purpose of modulating pain, promoting relaxation,  increasing ROM, reducing/eliminating soft tissue swelling/inflammation/restriction, improving soft tissue extensibility and allowing for proper ROM for normal function with self care, reaching, carrying, lifting, house/yardwork, driving/computer work    Modalities:      Charges:  Timed Code Treatment Minutes: 38   Total Treatment Minutes: 42     [] EVAL (LOW) 94558   [] EVAL (MOD) 11574   [] EVAL (HIGH) 98967   [] RE-EVAL   [x] FU(48757) x 1   [] IONTO  [] NMR (04384) x     [] VASO  [x] Manual (27146) x 1     [] Other:  [x] TA x1      [] Mech Traction (46978)  [] ES(attended) (32509)      [] ES (un) (33019):       Amina Jarvis stated goal: improve shoulder movement and strength     Therapist goals for Patient:   Short Term Goals: To be achieved in: 2 weeks  1. Independent in HEP and progression per patient tolerance, in order to prevent re-injury. []? Progressing: [x]? Met: []? Not Met: []? Adjusted   2. Patient will have a decrease in pain to facilitate improvement in movement, function, and ADLs as indicated by Functional Deficits. []? Progressing: [x]? Met: []? Not Met: []? Adjusted     Long Term Goals: To be achieved in:  4-5 months  1. Disability index score of 80% or more for the UEFI to assist with reaching prior level of function. [x]? Progressing: []? Met: [x]? Not Met: []? Adjusted  2.  Patient will demonstrate increased R shoulder AROM to  140 flex, 140 abd, and ER at 80 to 90 degrees to allow for proper joint functioning as indicated by patients Functional Deficits. [x]? Progressing: []? Met: [x]? Not Met: []? Adjusted  3. Patient will demonstrate an increase in R shoulder strength to 4/5 flex, abd, ER in UE to allow for proper functional mobility as indicated by patients Functional Deficits. [x]? Progressing: []? Met: [x]? Not Met: []? Adjusted  4. Patient will return to ADLs above shoulder height without increased symptoms or restriction. [x]? Progressing: []? Met: [x]? Not Met: []? Adjusted  5. Pt will return to getting dressed and self care without pain or limitations in R shoulder. [x]? Progressing: []? Met: [x]? Not Met: []? Adjusted       Overall Progression Towards Functional goals/ Treatment Progress Update:  [x] Patient is progressing as expected towards functional goals listed. [] Progression is slowed due to complexities/Impairments listed. [] Progression has been slowed due to co-morbidities. [] Plan just implemented, too soon to assess goals progression <30days   [] Goals require adjustment due to lack of progress  [] Patient is not progressing as expected and requires additional follow up with physician  [] Other    Prognosis for POC: [x] Good [] Fair  [] Poor      Patient requires continued skilled intervention: [x] Yes  [] No      ASSESSMENT:      Treatment/Activity Tolerance:  [x] Patient tolerated treatment well [] Patient limited by fatique  [] Patient limited by pain  [] Patient limited by other medical complications  [x] Other:  Pt did have set back a couple weeks ago with catching his granddaughter but pt is slowly improving in pain and motion since this. He did make good improvements in shoulder PROM/AAROM today compared to last week. However he continues to have tightness throughout his shoulder even with manual PROM. Continued to hold off BB IR stretching and strengthening today since pt is still sore in shoulder and so that pt can continue to focus on motion for now.  He does have less AROM shoulder flex compared to before incident but does have improved AROM shoulder abd. Pt will continue to be progressed in PT per post op protocol and per tolerance to improve R shoulder PROM, AROM and strength to improve functional use of R UE for ADLs      Return to Play: (if applicable)   []  Stage 1: Intro to Strength   []  Stage 2: Return to Run and Strength   []  Stage 3: Return to Jump and Strength   []  Stage 4: Dynamic Strength and Agility   []  Stage 5: Sport Specific Training     []  Ready to Return to Play, Meets All Above Stages   []  Not Ready for Return to Sports   Comments:            Prognosis: [x] Good [] Fair  [] Poor    Patient Requires Follow-up: [x] Yes  [] No    PLAN: 2x/week for 6 more weeks (4/25/22); manual PROM/STM, BB IR when able, return to isos when able  [x] Continue per plan of care [] Alter current plan (see comments above)  [] Plan of care initiated [] Hold pending MD visit [] Discharge    Note: If patient does not return for scheduled/ recommended follow up visits, this note will serve as a discharge from care along with most recent update on progress.      Electronically signed by: Juwan Nathan, PT, DPT

## 2022-04-26 DIAGNOSIS — I10 ESSENTIAL HYPERTENSION: ICD-10-CM

## 2022-04-27 RX ORDER — AMLODIPINE BESYLATE 10 MG/1
10 TABLET ORAL DAILY
Qty: 90 TABLET | Refills: 0 | Status: SHIPPED | OUTPATIENT
Start: 2022-04-27 | End: 2022-10-20 | Stop reason: SDUPTHER

## 2022-05-05 ENCOUNTER — HOSPITAL ENCOUNTER (OUTPATIENT)
Dept: PHYSICAL THERAPY | Age: 52
Setting detail: THERAPIES SERIES
Discharge: HOME | End: 2022-05-05

## 2022-05-05 NOTE — FLOWSHEET NOTE
[address] [facility]       Physical Therapy   Phone: [phone]   Fax: [phone]      Physical Therapy  Cancellation/No-show Note  Patient Name:  Bree Enriquez  :  1970   Date:  2022  Cancelled visits to date: 3  No-shows to date: 0    For today's appointment patient:  [x]  Cancelled  []  Rescheduled appointment  []  No-show     Reason given by patient:  []  Patient ill  []  Conflicting appointment  []  No transportation    [x]  Conflict with work- has work meeting  []  No reason given  []  Other:     Comments:     Electronically signed by:  Alma Wan, PT, PT

## 2022-05-12 ENCOUNTER — HOSPITAL ENCOUNTER (OUTPATIENT)
Dept: PHYSICAL THERAPY | Age: 52
Setting detail: THERAPIES SERIES
Discharge: HOME | End: 2022-05-12

## 2022-05-12 NOTE — FLOWSHEET NOTE
[address]       Physical Therapy   Phone: [phone]   Fax: [phone]      Physical Therapy  Cancellation/No-show Note  Patient Name:  Agatha Bernardo  :  1970   Date:  2022  Cancelled visits to date: 4  No-shows to date: 0    For today's appointment patient:  [x]  Cancelled  []  Rescheduled appointment  []  No-show     Reason given by patient:  []  Patient ill   []  Conflicting appointment  []  No transportation    []  Conflict with work-  []  No reason given  [x]  Other:     Comments: [place] the zoo and will not make it back in time    Electronically signed by:  Chantelle Elizalde, PT, PT

## 2022-07-20 DIAGNOSIS — I10 ESSENTIAL HYPERTENSION: ICD-10-CM

## 2022-07-21 RX ORDER — ALLOPURINOL 300 MG/1
TABLET ORAL
Qty: 90 TABLET | Refills: 3 | OUTPATIENT
Start: 2022-07-21

## 2022-07-21 RX ORDER — AMLODIPINE BESYLATE 10 MG/1
10 TABLET ORAL DAILY
Qty: 90 TABLET | Refills: 3 | OUTPATIENT
Start: 2022-07-21

## 2022-07-21 RX ORDER — LOSARTAN POTASSIUM 100 MG/1
TABLET ORAL
Qty: 90 TABLET | Refills: 3 | OUTPATIENT
Start: 2022-07-21

## 2022-10-19 ASSESSMENT — ENCOUNTER SYMPTOMS: SHORTNESS OF BREATH: 0

## 2022-10-20 ENCOUNTER — OFFICE VISIT (OUTPATIENT)
Dept: FAMILY MEDICINE CLINIC | Age: 52
End: 2022-10-20
Payer: COMMERCIAL

## 2022-10-20 VITALS
SYSTOLIC BLOOD PRESSURE: 118 MMHG | WEIGHT: 260 LBS | BODY MASS INDEX: 36.4 KG/M2 | DIASTOLIC BLOOD PRESSURE: 78 MMHG | HEIGHT: 71 IN | HEART RATE: 86 BPM

## 2022-10-20 DIAGNOSIS — M25.561 CHRONIC PAIN OF RIGHT KNEE: ICD-10-CM

## 2022-10-20 DIAGNOSIS — Z23 NEED FOR TDAP VACCINATION: ICD-10-CM

## 2022-10-20 DIAGNOSIS — M51.36 DDD (DEGENERATIVE DISC DISEASE), LUMBAR: ICD-10-CM

## 2022-10-20 DIAGNOSIS — Z23 NEED FOR INFLUENZA VACCINATION: ICD-10-CM

## 2022-10-20 DIAGNOSIS — Z11.59 NEED FOR HEPATITIS C SCREENING TEST: ICD-10-CM

## 2022-10-20 DIAGNOSIS — Z12.11 SCREEN FOR COLON CANCER: ICD-10-CM

## 2022-10-20 DIAGNOSIS — G89.29 CHRONIC PAIN OF RIGHT KNEE: ICD-10-CM

## 2022-10-20 DIAGNOSIS — I10 ESSENTIAL HYPERTENSION: Primary | ICD-10-CM

## 2022-10-20 DIAGNOSIS — Z23 NEED FOR SHINGLES VACCINE: ICD-10-CM

## 2022-10-20 DIAGNOSIS — M51.34 DDD (DEGENERATIVE DISC DISEASE), THORACIC: ICD-10-CM

## 2022-10-20 DIAGNOSIS — Z87.39 HISTORY OF GOUT: ICD-10-CM

## 2022-10-20 PROBLEM — M51.369 DDD (DEGENERATIVE DISC DISEASE), LUMBAR: Status: ACTIVE | Noted: 2022-10-20

## 2022-10-20 LAB
ANION GAP SERPL CALCULATED.3IONS-SCNC: 10 MMOL/L (ref 3–16)
BUN BLDV-MCNC: 13 MG/DL (ref 7–20)
CALCIUM SERPL-MCNC: 9.1 MG/DL (ref 8.3–10.6)
CHLORIDE BLD-SCNC: 98 MMOL/L (ref 99–110)
CHOLESTEROL, TOTAL: 133 MG/DL (ref 0–199)
CO2: 29 MMOL/L (ref 21–32)
CREAT SERPL-MCNC: 0.9 MG/DL (ref 0.9–1.3)
GFR SERPL CREATININE-BSD FRML MDRD: >60 ML/MIN/{1.73_M2}
GLUCOSE BLD-MCNC: 83 MG/DL (ref 70–99)
HDLC SERPL-MCNC: 26 MG/DL (ref 40–60)
HEPATITIS C ANTIBODY INTERPRETATION: NORMAL
LDL CHOLESTEROL CALCULATED: 81 MG/DL
POTASSIUM SERPL-SCNC: 4.3 MMOL/L (ref 3.5–5.1)
SODIUM BLD-SCNC: 137 MMOL/L (ref 136–145)
TRIGL SERPL-MCNC: 128 MG/DL (ref 0–150)
URIC ACID, SERUM: 5 MG/DL (ref 3.5–7.2)
VLDLC SERPL CALC-MCNC: 26 MG/DL

## 2022-10-20 PROCEDURE — 1036F TOBACCO NON-USER: CPT | Performed by: FAMILY MEDICINE

## 2022-10-20 PROCEDURE — G8427 DOCREV CUR MEDS BY ELIG CLIN: HCPCS | Performed by: FAMILY MEDICINE

## 2022-10-20 PROCEDURE — 3017F COLORECTAL CA SCREEN DOC REV: CPT | Performed by: FAMILY MEDICINE

## 2022-10-20 PROCEDURE — 99214 OFFICE O/P EST MOD 30 MIN: CPT | Performed by: FAMILY MEDICINE

## 2022-10-20 PROCEDURE — G8484 FLU IMMUNIZE NO ADMIN: HCPCS | Performed by: FAMILY MEDICINE

## 2022-10-20 PROCEDURE — G8417 CALC BMI ABV UP PARAM F/U: HCPCS | Performed by: FAMILY MEDICINE

## 2022-10-20 PROCEDURE — 36415 COLL VENOUS BLD VENIPUNCTURE: CPT | Performed by: FAMILY MEDICINE

## 2022-10-20 RX ORDER — ALLOPURINOL 300 MG/1
TABLET ORAL
Qty: 90 TABLET | Refills: 1 | Status: SHIPPED | OUTPATIENT
Start: 2022-10-20

## 2022-10-20 RX ORDER — INDOMETHACIN 50 MG/1
CAPSULE ORAL
Qty: 90 CAPSULE | Refills: 1 | Status: SHIPPED | OUTPATIENT
Start: 2022-10-20 | End: 2022-10-26

## 2022-10-20 RX ORDER — AMLODIPINE BESYLATE 10 MG/1
10 TABLET ORAL DAILY
Qty: 90 TABLET | Refills: 1 | Status: SHIPPED | OUTPATIENT
Start: 2022-10-20

## 2022-10-20 RX ORDER — LOSARTAN POTASSIUM 100 MG/1
TABLET ORAL
Qty: 90 TABLET | Refills: 1 | Status: SHIPPED | OUTPATIENT
Start: 2022-10-20 | End: 2022-10-21

## 2022-10-20 RX ORDER — CELECOXIB 200 MG/1
200 CAPSULE ORAL DAILY
Qty: 90 CAPSULE | Refills: 1 | Status: SHIPPED | OUTPATIENT
Start: 2022-10-20

## 2022-10-20 ASSESSMENT — PATIENT HEALTH QUESTIONNAIRE - PHQ9
2. FEELING DOWN, DEPRESSED OR HOPELESS: 0
1. LITTLE INTEREST OR PLEASURE IN DOING THINGS: 0
SUM OF ALL RESPONSES TO PHQ QUESTIONS 1-9: 0
SUM OF ALL RESPONSES TO PHQ9 QUESTIONS 1 & 2: 0

## 2022-10-20 NOTE — PROGRESS NOTES
Subjective:      Patient ID: Roya Hickman is a 49 y.o. male. Hypertension  This is a chronic problem. The current episode started more than 1 year ago. The problem is unchanged. The problem is controlled. Pertinent negatives include no chest pain, palpitations, peripheral edema or shortness of breath. There are no associated agents to hypertension. Risk factors for coronary artery disease include family history and male gender. Past treatments include angiotensin blockers and calcium channel blockers. The current treatment provides significant improvement. There are no compliance problems. Patient states that he has lost 40 lbs and is eating much better. His home BP readings have been much improved and he is interested in decreasing medications. Gout:  Patient is tolerating and compliant with Allopurinol 300 mg daily. He takes Indocin 50 mg TID prn. He denies any gout attacks since his last visit, but states that he will have a day or two of joint aches periodically. He is due for a Uric Acid level today. DDD Lumbar and Thoracic:  Patient was injured on his job in 2017. He has since seen 7 spinal surgeons and was told by multiple ones that he needed surgery for his back. He did not have any. He has had epidural injections. He is no longer employed there and his workman's comp case is closed. He is requesting an anti-inflammatory medication to take for this. Wt Readings from Last 3 Encounters:   10/20/22 260 lb (117.9 kg)   12/29/21 287 lb 3.2 oz (130.3 kg)   09/28/21 260 lb (117.9 kg)         Review of Systems   Respiratory:  Negative for shortness of breath. Cardiovascular:  Negative for chest pain and palpitations. /78   Pulse 86   Ht 5' 11\" (1.803 m)   Wt 260 lb (117.9 kg)   BMI 36.26 kg/m²    Objective:   Physical Exam  Vitals reviewed. Constitutional:       General: He is not in acute distress. Appearance: He is well-developed.    HENT:      Head: Normocephalic. Right Ear: External ear normal.      Left Ear: External ear normal.   Neck:      Thyroid: No thyromegaly. Vascular: No carotid bruit or JVD. Cardiovascular:      Rate and Rhythm: Normal rate and regular rhythm. Heart sounds: Normal heart sounds. No murmur heard. Pulmonary:      Effort: Pulmonary effort is normal.      Breath sounds: Normal breath sounds. No wheezing or rales. Lymphadenopathy:      Cervical: No cervical adenopathy. Neurological:      Mental Status: He is alert and oriented to person, place, and time. Assessment:      Hypertension   Gout  DDD Thoracic   DDD Lumbar       Plan:      Chem 7, Lipid Panel, Uric Acid, Hepatitis C  Stop Losartan and monitor BP at home since losing weight  Rx Celebrex 200 mg daily. (Discontinue Indocin).    Refilled medications  Flu Shot Declined   Tdap (Boostrix) shot given   Shingrix Shot Declined   I recommended the COVID vaccines   Cologuard Test Ordered   RTO 6 months for Hypertension / Gout         HANNAH SUNG, DO

## 2023-02-28 RX ORDER — CELECOXIB 200 MG/1
200 CAPSULE ORAL DAILY
Qty: 90 CAPSULE | Refills: 3 | Status: SHIPPED | OUTPATIENT
Start: 2023-02-28

## 2023-03-07 ENCOUNTER — TELEPHONE (OUTPATIENT)
Dept: ORTHOPEDIC SURGERY | Age: 53
End: 2023-03-07

## 2023-03-07 NOTE — TELEPHONE ENCOUNTER
Routed the medical records request from [address] [place] [address] to [facility] for release.

## 2023-03-08 ENCOUNTER — TELEPHONE (OUTPATIENT)
Dept: ORTHOPEDIC SURGERY | Age: 53
End: 2023-03-08

## 2023-03-08 DIAGNOSIS — I10 ESSENTIAL HYPERTENSION: ICD-10-CM

## 2023-03-08 RX ORDER — ALLOPURINOL 300 MG/1
TABLET ORAL
Qty: 90 TABLET | Refills: 0 | Status: SHIPPED | OUTPATIENT
Start: 2023-03-08

## 2023-03-08 RX ORDER — AMLODIPINE BESYLATE 10 MG/1
10 TABLET ORAL DAILY
Qty: 90 TABLET | Refills: 0 | Status: SHIPPED | OUTPATIENT
Start: 2023-03-08

## 2023-03-08 NOTE — TELEPHONE ENCOUNTER
Medical records from 9-28-21, 10-, 12- were faxed to 's Rajeev Bucio and Rai Villela and Charlie Zarco and damion

## 2023-06-02 DIAGNOSIS — I10 ESSENTIAL HYPERTENSION: ICD-10-CM

## 2023-06-02 RX ORDER — AMLODIPINE BESYLATE 10 MG/1
10 TABLET ORAL DAILY
Qty: 90 TABLET | Refills: 3 | OUTPATIENT
Start: 2023-06-02

## 2023-06-02 RX ORDER — ALLOPURINOL 300 MG/1
TABLET ORAL
Qty: 90 TABLET | Refills: 3 | OUTPATIENT
Start: 2023-06-02

## 2023-06-03 DIAGNOSIS — I10 ESSENTIAL HYPERTENSION: ICD-10-CM

## 2023-06-03 RX ORDER — AMLODIPINE BESYLATE 10 MG/1
10 TABLET ORAL DAILY
Qty: 90 TABLET | Refills: 3 | OUTPATIENT
Start: 2023-06-03

## 2023-06-03 RX ORDER — ALLOPURINOL 300 MG/1
TABLET ORAL
Qty: 90 TABLET | Refills: 3 | OUTPATIENT
Start: 2023-06-03

## 2023-07-26 DIAGNOSIS — I10 ESSENTIAL HYPERTENSION: ICD-10-CM

## 2023-07-26 RX ORDER — AMLODIPINE BESYLATE 10 MG/1
10 TABLET ORAL DAILY
Qty: 90 TABLET | Refills: 3 | OUTPATIENT
Start: 2023-07-26

## 2023-07-26 RX ORDER — ALLOPURINOL 300 MG/1
TABLET ORAL
Qty: 90 TABLET | Refills: 3 | OUTPATIENT
Start: 2023-07-26

## 2023-07-29 SDOH — ECONOMIC STABILITY: INCOME INSECURITY: HOW HARD IS IT FOR YOU TO PAY FOR THE VERY BASICS LIKE FOOD, HOUSING, MEDICAL CARE, AND HEATING?: NOT VERY HARD

## 2023-07-29 SDOH — ECONOMIC STABILITY: TRANSPORTATION INSECURITY
IN THE PAST 12 MONTHS, HAS LACK OF TRANSPORTATION KEPT YOU FROM MEETINGS, WORK, OR FROM GETTING THINGS NEEDED FOR DAILY LIVING?: NO

## 2023-07-29 SDOH — ECONOMIC STABILITY: FOOD INSECURITY: WITHIN THE PAST 12 MONTHS, YOU WORRIED THAT YOUR FOOD WOULD RUN OUT BEFORE YOU GOT MONEY TO BUY MORE.: NEVER TRUE

## 2023-07-29 SDOH — ECONOMIC STABILITY: HOUSING INSECURITY
IN THE LAST 12 MONTHS, WAS THERE A TIME WHEN YOU DID NOT HAVE A STEADY PLACE TO SLEEP OR SLEPT IN A SHELTER (INCLUDING NOW)?: NO

## 2023-07-29 SDOH — ECONOMIC STABILITY: FOOD INSECURITY: WITHIN THE PAST 12 MONTHS, THE FOOD YOU BOUGHT JUST DIDN'T LAST AND YOU DIDN'T HAVE MONEY TO GET MORE.: SOMETIMES TRUE

## 2023-07-29 ASSESSMENT — PATIENT HEALTH QUESTIONNAIRE - PHQ9
SUM OF ALL RESPONSES TO PHQ9 QUESTIONS 1 & 2: 0
1. LITTLE INTEREST OR PLEASURE IN DOING THINGS: 0
1. LITTLE INTEREST OR PLEASURE IN DOING THINGS: NOT AT ALL
SUM OF ALL RESPONSES TO PHQ QUESTIONS 1-9: 0
SUM OF ALL RESPONSES TO PHQ9 QUESTIONS 1 & 2: 0
2. FEELING DOWN, DEPRESSED OR HOPELESS: NOT AT ALL
2. FEELING DOWN, DEPRESSED OR HOPELESS: 0
SUM OF ALL RESPONSES TO PHQ QUESTIONS 1-9: 0

## 2023-07-31 ASSESSMENT — ENCOUNTER SYMPTOMS: SHORTNESS OF BREATH: 0

## 2023-08-01 ENCOUNTER — OFFICE VISIT (OUTPATIENT)
Dept: FAMILY MEDICINE CLINIC | Age: 53
End: 2023-08-01
Payer: COMMERCIAL

## 2023-08-01 VITALS
DIASTOLIC BLOOD PRESSURE: 80 MMHG | BODY MASS INDEX: 35.25 KG/M2 | WEIGHT: 251.8 LBS | SYSTOLIC BLOOD PRESSURE: 112 MMHG | HEIGHT: 71 IN

## 2023-08-01 DIAGNOSIS — M51.34 DDD (DEGENERATIVE DISC DISEASE), THORACIC: ICD-10-CM

## 2023-08-01 DIAGNOSIS — M51.36 DDD (DEGENERATIVE DISC DISEASE), LUMBAR: ICD-10-CM

## 2023-08-01 DIAGNOSIS — Z12.11 SCREEN FOR COLON CANCER: ICD-10-CM

## 2023-08-01 DIAGNOSIS — Z87.39 HISTORY OF GOUT: ICD-10-CM

## 2023-08-01 DIAGNOSIS — I10 ESSENTIAL HYPERTENSION: Primary | ICD-10-CM

## 2023-08-01 DIAGNOSIS — Z23 NEED FOR TDAP VACCINATION: ICD-10-CM

## 2023-08-01 DIAGNOSIS — Z23 NEED FOR SHINGLES VACCINE: ICD-10-CM

## 2023-08-01 PROCEDURE — 3079F DIAST BP 80-89 MM HG: CPT | Performed by: FAMILY MEDICINE

## 2023-08-01 PROCEDURE — 1036F TOBACCO NON-USER: CPT | Performed by: FAMILY MEDICINE

## 2023-08-01 PROCEDURE — G8417 CALC BMI ABV UP PARAM F/U: HCPCS | Performed by: FAMILY MEDICINE

## 2023-08-01 PROCEDURE — G8427 DOCREV CUR MEDS BY ELIG CLIN: HCPCS | Performed by: FAMILY MEDICINE

## 2023-08-01 PROCEDURE — 3074F SYST BP LT 130 MM HG: CPT | Performed by: FAMILY MEDICINE

## 2023-08-01 PROCEDURE — 99213 OFFICE O/P EST LOW 20 MIN: CPT | Performed by: FAMILY MEDICINE

## 2023-08-01 PROCEDURE — 3017F COLORECTAL CA SCREEN DOC REV: CPT | Performed by: FAMILY MEDICINE

## 2023-08-01 RX ORDER — AMLODIPINE BESYLATE 10 MG/1
10 TABLET ORAL DAILY
Qty: 90 TABLET | Refills: 1 | Status: SHIPPED | OUTPATIENT
Start: 2023-08-01

## 2023-08-01 RX ORDER — CELECOXIB 200 MG/1
200 CAPSULE ORAL DAILY
Qty: 90 CAPSULE | Refills: 1 | Status: SHIPPED | OUTPATIENT
Start: 2023-08-01

## 2023-08-01 RX ORDER — ALLOPURINOL 300 MG/1
TABLET ORAL
Qty: 90 TABLET | Refills: 1 | Status: SHIPPED | OUTPATIENT
Start: 2023-08-01

## 2023-08-01 NOTE — PROGRESS NOTES
Rhythm: Normal rate and regular rhythm. Heart sounds: Normal heart sounds. No murmur heard. Pulmonary:      Effort: Pulmonary effort is normal.      Breath sounds: Normal breath sounds. No wheezing or rales. Lymphadenopathy:      Cervical: No cervical adenopathy. Neurological:      Mental Status: He is alert and oriented to person, place, and time.        Assessment:      Hypertension   Gout  DDD Thoracic   DDD Lumbar       Plan:      Refilled medications  Patient will call with the last Tdap shot (within 3 years)  Shingrix Shot Declined   Reminded patient to do the Cologard Test   RTO 6 months for Hypertension / Gout         HANNAH CIFUENTES [address] [facility] [facility], DO

## 2023-08-03 ENCOUNTER — TELEPHONE (OUTPATIENT)
Dept: ORTHOPEDIC SURGERY | Age: 53
End: 2023-08-03

## 2023-08-03 NOTE — TELEPHONE ENCOUNTER
Imported a NO RECORDS statement for medical for 12/7/17 for Right knee, left shoulder, and back pain for [facility] [employer] into [employer]

## 2023-12-19 DIAGNOSIS — Z87.39 HISTORY OF GOUT: ICD-10-CM

## 2023-12-19 DIAGNOSIS — I10 ESSENTIAL HYPERTENSION: ICD-10-CM

## 2023-12-20 RX ORDER — ALLOPURINOL 300 MG/1
TABLET ORAL
Qty: 90 TABLET | Refills: 3 | OUTPATIENT
Start: 2023-12-20

## 2023-12-20 RX ORDER — AMLODIPINE BESYLATE 10 MG/1
10 TABLET ORAL DAILY
Qty: 90 TABLET | Refills: 3 | OUTPATIENT
Start: 2023-12-20

## 2024-01-30 DIAGNOSIS — M51.34 DDD (DEGENERATIVE DISC DISEASE), THORACIC: ICD-10-CM

## 2024-01-30 DIAGNOSIS — M51.36 DDD (DEGENERATIVE DISC DISEASE), LUMBAR: ICD-10-CM

## 2024-01-30 DIAGNOSIS — Z87.39 HISTORY OF GOUT: ICD-10-CM

## 2024-01-31 RX ORDER — CELECOXIB 200 MG/1
200 CAPSULE ORAL DAILY
Qty: 90 CAPSULE | Refills: 3 | OUTPATIENT
Start: 2024-01-31

## 2024-02-09 DIAGNOSIS — M51.34 DDD (DEGENERATIVE DISC DISEASE), THORACIC: ICD-10-CM

## 2024-02-09 DIAGNOSIS — I10 ESSENTIAL HYPERTENSION: ICD-10-CM

## 2024-02-09 DIAGNOSIS — Z87.39 HISTORY OF GOUT: ICD-10-CM

## 2024-02-09 DIAGNOSIS — M51.36 DDD (DEGENERATIVE DISC DISEASE), LUMBAR: ICD-10-CM

## 2024-02-09 RX ORDER — ALLOPURINOL 300 MG/1
TABLET ORAL
Qty: 90 TABLET | Refills: 3 | OUTPATIENT
Start: 2024-02-09

## 2024-02-09 RX ORDER — AMLODIPINE BESYLATE 10 MG/1
10 TABLET ORAL DAILY
Qty: 90 TABLET | Refills: 3 | OUTPATIENT
Start: 2024-02-09

## 2024-02-09 RX ORDER — CELECOXIB 200 MG/1
200 CAPSULE ORAL DAILY
Qty: 90 CAPSULE | Refills: 3 | OUTPATIENT
Start: 2024-02-09

## 2024-02-09 NOTE — TELEPHONE ENCOUNTER
Last office visit 8/1/2023     Last written     Next office visit scheduled Visit date not found    Requested Prescriptions     Pending Prescriptions Disp Refills    amLODIPine (NORVASC) 10 MG tablet [Pharmacy Med Name: amLODIPine Besylate 10 MG Oral Tablet] 90 tablet 3     Sig: TAKE 1 TABLET BY MOUTH DAILY    celecoxib (CELEBREX) 200 MG capsule [Pharmacy Med Name: Celecoxib 200 MG Oral Capsule] 90 capsule 3     Sig: TAKE 1 CAPSULE BY MOUTH DAILY    allopurinol (ZYLOPRIM) 300 MG tablet [Pharmacy Med Name: Allopurinol 300 MG Oral Tablet] 90 tablet 3     Sig: TAKE 1 TABLET BY MOUTH DAILY

## 2024-02-11 RX ORDER — ALLOPURINOL 300 MG/1
TABLET ORAL
Qty: 90 TABLET | Refills: 1 | OUTPATIENT
Start: 2024-02-11

## 2024-02-23 ASSESSMENT — PATIENT HEALTH QUESTIONNAIRE - PHQ9
SUM OF ALL RESPONSES TO PHQ9 QUESTIONS 1 & 2: 0
1. LITTLE INTEREST OR PLEASURE IN DOING THINGS: NOT AT ALL
SUM OF ALL RESPONSES TO PHQ QUESTIONS 1-9: 0
2. FEELING DOWN, DEPRESSED OR HOPELESS: 0
2. FEELING DOWN, DEPRESSED OR HOPELESS: NOT AT ALL
1. LITTLE INTEREST OR PLEASURE IN DOING THINGS: 0
SUM OF ALL RESPONSES TO PHQ QUESTIONS 1-9: 0
SUM OF ALL RESPONSES TO PHQ QUESTIONS 1-9: 0
SUM OF ALL RESPONSES TO PHQ9 QUESTIONS 1 & 2: 0
SUM OF ALL RESPONSES TO PHQ QUESTIONS 1-9: 0

## 2024-02-26 ASSESSMENT — ENCOUNTER SYMPTOMS: SHORTNESS OF BREATH: 0

## 2024-02-26 NOTE — PROGRESS NOTES
Subjective:      Patient ID: Hernesto Gurrola is a 53 y.o. male.    Hypertension  This is a chronic problem. The current episode started more than 1 year ago. The problem is unchanged. The problem is controlled. Pertinent negatives include no chest pain, palpitations, peripheral edema or shortness of breath. There are no associated agents to hypertension. Risk factors for coronary artery disease include family history and male gender. Past treatments include calcium channel blockers. The current treatment provides significant improvement. There are no compliance problems.      Gout:  Patient is tolerating and compliant with Allopurinol 300 mg daily.  He takes Celebrex 200 mg daily prn.  He denies any gout attacks since his last visit, but states that he will have a day or two of joint aches periodically.  He is due for a Uric Acid level today.        DDD Lumbar and Thoracic:  Patient was injured on his job in 2017.  He has since seen 7 spinal surgeons and was told by multiple ones that he needed surgery for his back.  He did not have any.  He has had epidural injections.  He is no longer employed there and his workman's comp case is closed.  He takes Celebrex 200 mg daily as needed.  He feels that the medication keeps him functional.       Fatigue:  Patient complains of chronic fatigue and daytime somnolence.  His wife states that he snores.  He would like to have the testosterone checked, but denies loss of libido.      Review of Systems   Constitutional:  Negative for chills and fever.   Respiratory:  Negative for shortness of breath.    Cardiovascular:  Negative for chest pain, palpitations and leg swelling.     /84   Ht 1.803 m (5' 11\")   Wt 112.5 kg (248 lb)   BMI 34.59 kg/m²    Objective:   Physical Exam  Vitals reviewed.   Constitutional:       General: He is not in acute distress.     Appearance: He is well-developed.   HENT:      Head: Normocephalic.      Right Ear: External ear normal.

## 2024-02-27 ENCOUNTER — OFFICE VISIT (OUTPATIENT)
Dept: FAMILY MEDICINE CLINIC | Age: 54
End: 2024-02-27
Payer: COMMERCIAL

## 2024-02-27 VITALS
HEIGHT: 71 IN | DIASTOLIC BLOOD PRESSURE: 84 MMHG | WEIGHT: 248 LBS | SYSTOLIC BLOOD PRESSURE: 130 MMHG | BODY MASS INDEX: 34.72 KG/M2

## 2024-02-27 DIAGNOSIS — Z87.39 HISTORY OF GOUT: ICD-10-CM

## 2024-02-27 DIAGNOSIS — M51.34 DDD (DEGENERATIVE DISC DISEASE), THORACIC: ICD-10-CM

## 2024-02-27 DIAGNOSIS — Z23 NEED FOR TDAP VACCINATION: ICD-10-CM

## 2024-02-27 DIAGNOSIS — R40.0 DAYTIME SOMNOLENCE: ICD-10-CM

## 2024-02-27 DIAGNOSIS — I10 ESSENTIAL HYPERTENSION: Primary | ICD-10-CM

## 2024-02-27 DIAGNOSIS — M51.36 DDD (DEGENERATIVE DISC DISEASE), LUMBAR: ICD-10-CM

## 2024-02-27 DIAGNOSIS — Z12.11 SCREEN FOR COLON CANCER: ICD-10-CM

## 2024-02-27 DIAGNOSIS — R53.83 OTHER FATIGUE: ICD-10-CM

## 2024-02-27 DIAGNOSIS — Z23 NEED FOR SHINGLES VACCINE: ICD-10-CM

## 2024-02-27 LAB
ANION GAP SERPL CALCULATED.3IONS-SCNC: 10 MMOL/L (ref 3–16)
BUN SERPL-MCNC: 14 MG/DL (ref 7–20)
CALCIUM SERPL-MCNC: 9.1 MG/DL (ref 8.3–10.6)
CHLORIDE SERPL-SCNC: 105 MMOL/L (ref 99–110)
CHOLEST SERPL-MCNC: 135 MG/DL (ref 0–199)
CO2 SERPL-SCNC: 28 MMOL/L (ref 21–32)
CREAT SERPL-MCNC: 0.8 MG/DL (ref 0.9–1.3)
GFR SERPLBLD CREATININE-BSD FMLA CKD-EPI: >60 ML/MIN/{1.73_M2}
GLUCOSE SERPL-MCNC: 89 MG/DL (ref 70–99)
HDLC SERPL-MCNC: 29 MG/DL (ref 40–60)
LDLC SERPL CALC-MCNC: 85 MG/DL
POTASSIUM SERPL-SCNC: 4.4 MMOL/L (ref 3.5–5.1)
SODIUM SERPL-SCNC: 143 MMOL/L (ref 136–145)
TRIGL SERPL-MCNC: 106 MG/DL (ref 0–150)
URATE SERPL-MCNC: 5.9 MG/DL (ref 3.5–7.2)
VLDLC SERPL CALC-MCNC: 21 MG/DL

## 2024-02-27 PROCEDURE — G8427 DOCREV CUR MEDS BY ELIG CLIN: HCPCS | Performed by: FAMILY MEDICINE

## 2024-02-27 PROCEDURE — 36415 COLL VENOUS BLD VENIPUNCTURE: CPT | Performed by: FAMILY MEDICINE

## 2024-02-27 PROCEDURE — 3017F COLORECTAL CA SCREEN DOC REV: CPT | Performed by: FAMILY MEDICINE

## 2024-02-27 PROCEDURE — 3075F SYST BP GE 130 - 139MM HG: CPT | Performed by: FAMILY MEDICINE

## 2024-02-27 PROCEDURE — G8417 CALC BMI ABV UP PARAM F/U: HCPCS | Performed by: FAMILY MEDICINE

## 2024-02-27 PROCEDURE — 3079F DIAST BP 80-89 MM HG: CPT | Performed by: FAMILY MEDICINE

## 2024-02-27 PROCEDURE — G8484 FLU IMMUNIZE NO ADMIN: HCPCS | Performed by: FAMILY MEDICINE

## 2024-02-27 PROCEDURE — 1036F TOBACCO NON-USER: CPT | Performed by: FAMILY MEDICINE

## 2024-02-27 PROCEDURE — 99214 OFFICE O/P EST MOD 30 MIN: CPT | Performed by: FAMILY MEDICINE

## 2024-02-27 RX ORDER — AMLODIPINE BESYLATE 10 MG/1
10 TABLET ORAL DAILY
Qty: 90 TABLET | Refills: 1 | Status: SHIPPED | OUTPATIENT
Start: 2024-02-27

## 2024-02-27 RX ORDER — CELECOXIB 200 MG/1
200 CAPSULE ORAL DAILY
Qty: 90 CAPSULE | Refills: 1 | Status: SHIPPED | OUTPATIENT
Start: 2024-02-27

## 2024-02-27 RX ORDER — ALLOPURINOL 300 MG/1
TABLET ORAL
Qty: 90 TABLET | Refills: 1 | Status: SHIPPED | OUTPATIENT
Start: 2024-02-27

## 2024-03-28 LAB — NONINV COLON CA DNA+OCC BLD SCRN STL QL: NEGATIVE

## 2024-05-08 ENCOUNTER — OFFICE VISIT (OUTPATIENT)
Dept: SLEEP MEDICINE | Age: 54
End: 2024-05-08
Payer: COMMERCIAL

## 2024-05-08 VITALS
WEIGHT: 253 LBS | OXYGEN SATURATION: 98 % | HEIGHT: 70 IN | TEMPERATURE: 98.6 F | DIASTOLIC BLOOD PRESSURE: 70 MMHG | HEART RATE: 85 BPM | BODY MASS INDEX: 36.22 KG/M2 | SYSTOLIC BLOOD PRESSURE: 120 MMHG | RESPIRATION RATE: 18 BRPM

## 2024-05-08 DIAGNOSIS — E66.9 OBESITY (BMI 30.0-34.9): ICD-10-CM

## 2024-05-08 DIAGNOSIS — G47.33 OSA (OBSTRUCTIVE SLEEP APNEA): Primary | ICD-10-CM

## 2024-05-08 DIAGNOSIS — R06.83 SNORING: ICD-10-CM

## 2024-05-08 DIAGNOSIS — R06.81 WITNESSED EPISODE OF APNEA: ICD-10-CM

## 2024-05-08 DIAGNOSIS — I10 ESSENTIAL HYPERTENSION: ICD-10-CM

## 2024-05-08 DIAGNOSIS — R53.83 FATIGUE, UNSPECIFIED TYPE: ICD-10-CM

## 2024-05-08 DIAGNOSIS — G47.19 EXCESSIVE DAYTIME SLEEPINESS: ICD-10-CM

## 2024-05-08 PROCEDURE — 99204 OFFICE O/P NEW MOD 45 MIN: CPT | Performed by: PSYCHIATRY & NEUROLOGY

## 2024-05-08 PROCEDURE — 3074F SYST BP LT 130 MM HG: CPT | Performed by: PSYCHIATRY & NEUROLOGY

## 2024-05-08 PROCEDURE — 1036F TOBACCO NON-USER: CPT | Performed by: PSYCHIATRY & NEUROLOGY

## 2024-05-08 PROCEDURE — 3078F DIAST BP <80 MM HG: CPT | Performed by: PSYCHIATRY & NEUROLOGY

## 2024-05-08 PROCEDURE — 3017F COLORECTAL CA SCREEN DOC REV: CPT | Performed by: PSYCHIATRY & NEUROLOGY

## 2024-05-08 PROCEDURE — G8427 DOCREV CUR MEDS BY ELIG CLIN: HCPCS | Performed by: PSYCHIATRY & NEUROLOGY

## 2024-05-08 PROCEDURE — G8417 CALC BMI ABV UP PARAM F/U: HCPCS | Performed by: PSYCHIATRY & NEUROLOGY

## 2024-05-08 ASSESSMENT — SLEEP AND FATIGUE QUESTIONNAIRES
HOW LIKELY ARE YOU TO NOD OFF OR FALL ASLEEP WHILE LYING DOWN TO REST IN THE AFTERNOON WHEN CIRCUMSTANCES PERMIT: MODERATE CHANCE OF DOZING
HOW LIKELY ARE YOU TO NOD OFF OR FALL ASLEEP WHILE SITTING QUIETLY AFTER LUNCH WITHOUT ALCOHOL: SLIGHT CHANCE OF DOZING
NECK CIRCUMFERENCE (INCHES): 16.5
ESS TOTAL SCORE: 6
HOW LIKELY ARE YOU TO NOD OFF OR FALL ASLEEP WHILE SITTING AND READING: SLIGHT CHANCE OF DOZING
HOW LIKELY ARE YOU TO NOD OFF OR FALL ASLEEP WHEN YOU ARE A PASSENGER IN A CAR FOR AN HOUR WITHOUT A BREAK: WOULD NEVER DOZE
HOW LIKELY ARE YOU TO NOD OFF OR FALL ASLEEP WHILE WATCHING TV: SLIGHT CHANCE OF DOZING
HOW LIKELY ARE YOU TO NOD OFF OR FALL ASLEEP WHILE SITTING AND TALKING TO SOMEONE: WOULD NEVER DOZE
HOW LIKELY ARE YOU TO NOD OFF OR FALL ASLEEP IN A CAR, WHILE STOPPED FOR A FEW MINUTES IN TRAFFIC: WOULD NEVER DOZE
HOW LIKELY ARE YOU TO NOD OFF OR FALL ASLEEP WHILE SITTING INACTIVE IN A PUBLIC PLACE: SLIGHT CHANCE OF DOZING

## 2024-05-08 ASSESSMENT — ENCOUNTER SYMPTOMS
CHOKING: 1
GASTROINTESTINAL NEGATIVE: 1
SORE THROAT: 1
APNEA: 1
EYES NEGATIVE: 1
BACK PAIN: 1

## 2024-05-08 NOTE — PROGRESS NOTES
MD REDDY Eagle Board Certified in Sleep Medicine  [facility]  Board Certified in Neurology [facility]  [address]   [address]  [place]  [address]  P- [phone]   [facility]   [address]  [address]   [place], [place] [address]           [facility] PHYSICIANS [place] [facility] SLEEP MEDICINE WEST  [address] [address] [phone]    Subjective:     Patient ID: Hernesto Gurrola is a 53 y.o. male.    Chief Complaint   Patient presents with    Follow-up    Sleep Problem       HPI:        Hernesto Gurrola is a 53 y.o. male referred by Dr. Yang for a sleep evaluation. He complains of snoring, snorting, choking, periods of not breathing, tossing and turning, kicking, decreased concentration, excessive daytime sleepiness, feels sleepy during the day, take naps during the day but he denies knees buckling with laughing, completely or partially paralyzed while falling asleep or waking up.  Symptoms began several years ago, gradually worsening since that time.   The patient's bed-partner confirmed the snoring and stopped breathing at night.  SLEEP SCHEDULE: Goes to bed around 10 PM in the weekdays and 10 PM in the weekends. It usually takes the patient 10 minutes to fall asleep. The patient gets up 2-3 per night to go to the bathroom. The Patient finally gets up at 5:30 AM during the weekdays and 6 AM in the weekends. patient wakes up with dry mouth.  The patient has restless sleep with frequent arousals in addition to the Patient has significant daytime sleepiness. The Patient scored Lyburn Sleepiness Score: 6 on Lyburn Sleepiness Scale ( more than 10 is indicative of daytime sleepiness)and 36 in fatigue scale ( more than 36 is indicative of daytime fatigue). The patient takes 1-2 naps/week for 4-30 minutes and usually is not refreshing nap.     Previous evaluation

## 2024-05-08 NOTE — PATIENT INSTRUCTIONS
Orders Placed This Encounter   Procedures    Home Sleep Study     Standing Status:   Future     Standing Expiration Date:   5/8/2025     Order Specific Question:   Location For Sleep Study     Answer:   [place]     Order Specific Question:   Select Sleep Lab Location     Answer:   [facility]    Sleep Study with PAP Titration     Standing Status:   Future     Standing Expiration Date:   5/8/2025     Order Specific Question:   Sleep Study Titration Type     Answer:   CPAP     Order Specific Question:   Location For Sleep Study     Answer:   [place]     Order Specific Question:   Select Sleep Lab Location     Answer:   [facility]

## 2024-07-10 ENCOUNTER — TELEPHONE (OUTPATIENT)
Dept: FAMILY MEDICINE CLINIC | Age: 54
End: 2024-07-10

## 2024-07-16 DIAGNOSIS — M51.36 DDD (DEGENERATIVE DISC DISEASE), LUMBAR: ICD-10-CM

## 2024-07-16 DIAGNOSIS — M51.34 DDD (DEGENERATIVE DISC DISEASE), THORACIC: ICD-10-CM

## 2024-07-16 DIAGNOSIS — Z87.39 HISTORY OF GOUT: ICD-10-CM

## 2024-07-16 DIAGNOSIS — I10 ESSENTIAL HYPERTENSION: ICD-10-CM

## 2024-07-17 DIAGNOSIS — Z87.39 HISTORY OF GOUT: ICD-10-CM

## 2024-07-17 RX ORDER — AMLODIPINE BESYLATE 10 MG/1
10 TABLET ORAL DAILY
Qty: 90 TABLET | Refills: 3 | Status: SHIPPED | OUTPATIENT
Start: 2024-07-17

## 2024-07-17 RX ORDER — CELECOXIB 200 MG/1
200 CAPSULE ORAL DAILY
Qty: 90 CAPSULE | Refills: 3 | Status: SHIPPED | OUTPATIENT
Start: 2024-07-17

## 2024-07-17 RX ORDER — ALLOPURINOL 300 MG/1
TABLET ORAL
Qty: 90 TABLET | Refills: 3 | Status: SHIPPED | OUTPATIENT
Start: 2024-07-17

## 2024-08-12 ENCOUNTER — OFFICE VISIT (OUTPATIENT)
Dept: FAMILY MEDICINE CLINIC | Age: 54
End: 2024-08-12
Payer: COMMERCIAL

## 2024-08-12 ENCOUNTER — TELEPHONE (OUTPATIENT)
Dept: FAMILY MEDICINE CLINIC | Age: 54
End: 2024-08-12

## 2024-08-12 VITALS
BODY MASS INDEX: 33.18 KG/M2 | DIASTOLIC BLOOD PRESSURE: 84 MMHG | WEIGHT: 237 LBS | SYSTOLIC BLOOD PRESSURE: 126 MMHG | HEIGHT: 71 IN

## 2024-08-12 DIAGNOSIS — H91.90 HEARING LOSS, UNSPECIFIED HEARING LOSS TYPE, UNSPECIFIED LATERALITY: Primary | ICD-10-CM

## 2024-08-12 DIAGNOSIS — Z87.39 HISTORY OF GOUT: ICD-10-CM

## 2024-08-12 DIAGNOSIS — H93.13 TINNITUS OF BOTH EARS: ICD-10-CM

## 2024-08-12 DIAGNOSIS — I10 ESSENTIAL HYPERTENSION: Primary | ICD-10-CM

## 2024-08-12 DIAGNOSIS — Z23 NEED FOR SHINGLES VACCINE: ICD-10-CM

## 2024-08-12 DIAGNOSIS — N50.89 MASS OF RIGHT TESTICLE: ICD-10-CM

## 2024-08-12 DIAGNOSIS — Z23 NEED FOR TDAP VACCINATION: ICD-10-CM

## 2024-08-12 DIAGNOSIS — M51.34 DDD (DEGENERATIVE DISC DISEASE), THORACIC: ICD-10-CM

## 2024-08-12 DIAGNOSIS — M51.36 DDD (DEGENERATIVE DISC DISEASE), LUMBAR: ICD-10-CM

## 2024-08-12 DIAGNOSIS — H91.93 BILATERAL HEARING LOSS, UNSPECIFIED HEARING LOSS TYPE: ICD-10-CM

## 2024-08-12 PROCEDURE — 3079F DIAST BP 80-89 MM HG: CPT | Performed by: FAMILY MEDICINE

## 2024-08-12 PROCEDURE — 3074F SYST BP LT 130 MM HG: CPT | Performed by: FAMILY MEDICINE

## 2024-08-12 PROCEDURE — 99214 OFFICE O/P EST MOD 30 MIN: CPT | Performed by: FAMILY MEDICINE

## 2024-08-12 PROCEDURE — G8427 DOCREV CUR MEDS BY ELIG CLIN: HCPCS | Performed by: FAMILY MEDICINE

## 2024-08-12 PROCEDURE — 3017F COLORECTAL CA SCREEN DOC REV: CPT | Performed by: FAMILY MEDICINE

## 2024-08-12 PROCEDURE — G8417 CALC BMI ABV UP PARAM F/U: HCPCS | Performed by: FAMILY MEDICINE

## 2024-08-12 PROCEDURE — 1036F TOBACCO NON-USER: CPT | Performed by: FAMILY MEDICINE

## 2024-08-12 RX ORDER — METOPROLOL SUCCINATE 50 MG/1
50 TABLET, EXTENDED RELEASE ORAL DAILY
Qty: 90 TABLET | Refills: 1 | Status: SHIPPED | OUTPATIENT
Start: 2024-08-12

## 2024-08-12 SDOH — ECONOMIC STABILITY: FOOD INSECURITY: WITHIN THE PAST 12 MONTHS, YOU WORRIED THAT YOUR FOOD WOULD RUN OUT BEFORE YOU GOT MONEY TO BUY MORE.: NEVER TRUE

## 2024-08-12 SDOH — ECONOMIC STABILITY: INCOME INSECURITY: HOW HARD IS IT FOR YOU TO PAY FOR THE VERY BASICS LIKE FOOD, HOUSING, MEDICAL CARE, AND HEATING?: NOT VERY HARD

## 2024-08-12 SDOH — ECONOMIC STABILITY: FOOD INSECURITY: WITHIN THE PAST 12 MONTHS, THE FOOD YOU BOUGHT JUST DIDN'T LAST AND YOU DIDN'T HAVE MONEY TO GET MORE.: NEVER TRUE

## 2024-08-12 ASSESSMENT — ENCOUNTER SYMPTOMS: SHORTNESS OF BREATH: 0

## 2024-08-12 NOTE — TELEPHONE ENCOUNTER
----- Message from Aníbal ASHBY sent at 8/12/2024  8:41 AM EDT -----  Regarding: ECC Escalation To Practice  ECC Escalation To Practice      Type of Escalation: Red Flag Symptom  --------------------------------------------------------------------------------------------------------------------------    Information for Provider:  Patient is looking for appointment for: Symptom Swelling   Reasons for Message: Unable to reach practice     Additional Information Patient is experience a swelling to his shoulder,back pain,lump in his ear and also he can't can't hear .   --------------------------------------------------------------------------------------------------------------------------    Relationship to Patient: Spouse/Partner Wife/Nancy     Call Back Info: OK to leave message on voicemail  Preferred Call Back Number: [phone]

## 2024-08-12 NOTE — PROGRESS NOTES
Subjective:      Patient ID: Hernesto Gurrola is a 53 y.o. male.    Hypertension  This is a chronic problem. The current episode started more than 1 year ago. The problem is unchanged. The problem is controlled. Associated symptoms include peripheral edema. Pertinent negatives include no chest pain, palpitations or shortness of breath. There are no associated agents to hypertension. Risk factors for coronary artery disease include family history and male gender. Past treatments include calcium channel blockers. The current treatment provides significant improvement. There are no compliance problems.      Gout:  Patient is tolerating and compliant with Allopurinol 300 mg daily.  He takes Celebrex 200 mg daily prn.  He denies any gout attacks since his last visit, but states that he will have a day or two of joint aches periodically.  His Uric Acid was 5.9 on 2-27-24.        DDD Lumbar and Thoracic:  Patient was injured on his job in 2017.  He has since seen 7 spinal surgeons and was told by multiple ones that he needed surgery for his back.  He did not have any.  He has had epidural injections.  He is no longer employed there and his workman's comp case is closed.  He takes Celebrex 200 mg daily as needed.  He feels that the medication keeps him functional.       Clogged Ears / Tinnitus:  Patient states that over the last 6 weeks, he has noticed hearing loss and ringing in the ears.  He went to [facility] 2 weeks ago and had the wax cleaned out.  It has not helped the symptoms.      Edema:  Patient complains of edema in both legs over the past 6 weeks, He states that it is worse at the end of the day and is resolved in the AM.   He is on Norvasc for BP.      Lump on Right Testicle:  Patient noticed a lump on the right testicle over the last month.  It is not painful.      Review of Systems   Constitutional:  Negative for chills and fever.   Respiratory:  Negative for shortness of breath.    Cardiovascular:

## 2024-08-26 ENCOUNTER — OFFICE VISIT (OUTPATIENT)
Dept: ENT CLINIC | Age: 54
End: 2024-08-26
Payer: COMMERCIAL

## 2024-08-26 ENCOUNTER — PROCEDURE VISIT (OUTPATIENT)
Dept: AUDIOLOGY | Age: 54
End: 2024-08-26
Payer: COMMERCIAL

## 2024-08-26 VITALS
HEART RATE: 73 BPM | WEIGHT: 234 LBS | BODY MASS INDEX: 32.76 KG/M2 | SYSTOLIC BLOOD PRESSURE: 122 MMHG | HEIGHT: 71 IN | OXYGEN SATURATION: 98 % | DIASTOLIC BLOOD PRESSURE: 84 MMHG

## 2024-08-26 DIAGNOSIS — S09.90XA TRAUMATIC INJURY OF HEAD, INITIAL ENCOUNTER: ICD-10-CM

## 2024-08-26 DIAGNOSIS — H93.13 TINNITUS OF BOTH EARS: ICD-10-CM

## 2024-08-26 DIAGNOSIS — H90.3 SENSORINEURAL HEARING LOSS (SNHL) OF BOTH EARS: Primary | ICD-10-CM

## 2024-08-26 DIAGNOSIS — R42 DIZZINESS: ICD-10-CM

## 2024-08-26 PROCEDURE — 99203 OFFICE O/P NEW LOW 30 MIN: CPT | Performed by: OTOLARYNGOLOGY

## 2024-08-26 PROCEDURE — 92567 TYMPANOMETRY: CPT

## 2024-08-26 PROCEDURE — 3074F SYST BP LT 130 MM HG: CPT | Performed by: OTOLARYNGOLOGY

## 2024-08-26 PROCEDURE — 3017F COLORECTAL CA SCREEN DOC REV: CPT | Performed by: OTOLARYNGOLOGY

## 2024-08-26 PROCEDURE — 1036F TOBACCO NON-USER: CPT | Performed by: OTOLARYNGOLOGY

## 2024-08-26 PROCEDURE — G8427 DOCREV CUR MEDS BY ELIG CLIN: HCPCS | Performed by: OTOLARYNGOLOGY

## 2024-08-26 PROCEDURE — 3079F DIAST BP 80-89 MM HG: CPT | Performed by: OTOLARYNGOLOGY

## 2024-08-26 PROCEDURE — 92557 COMPREHENSIVE HEARING TEST: CPT

## 2024-08-26 PROCEDURE — G8417 CALC BMI ABV UP PARAM F/U: HCPCS | Performed by: OTOLARYNGOLOGY

## 2024-08-26 NOTE — PATIENT INSTRUCTIONS
Hearing Loss: Care Instructions  Your Care Instructions      Hearing loss is a sudden or slow decrease in how well you hear. It can range from mild to profound. Permanent hearing loss can occur with aging, and it can happen when you are exposed long-term to loud noise. Examples include listening to loud music, riding motorcycles, or being around other loud machines.    Hearing loss can affect your work and home life. It can make you feel lonely or depressed. You may feel that you have lost your independence. But hearing aids and other devices can help you hear better and feel connected to others.    Follow-up care is a key part of your treatment and safety. Be sure to make and go to all appointments, and call your doctor if you are having problems. It's also a good idea to know your test results and keep a list of the medicines you take.    How can you care for yourself at home?  Avoid loud noises whenever possible. This helps keep your hearing from getting worse.  Always wear hearing protection around loud noises.  If appropriate, wear hearing aid(s) as directed.  It is recommended that hearing aids are worn during all waking hours to keep your brain active and give it access to the sounds it is missing.      If you are beginning your process with hearing aid(s), schedule a \"Hearing Aid Evaluation\" with an audiologist to discuss your lifestyle, features of hearing aid technology, and styles of hearing aids available.  It is recommended that you contact your insurance company to determine if you have a hearing aid benefit, as this may dictate who you can see for these services.  Have hearing tests as your doctor suggests. They can show whether your hearing has changed. Your hearing aid may need to be adjusted.  Use other assistive devices as needed. These may include:  Telephone amplifiers and hearing aids that can connect to a television, stereo, radio, or microphone.  Devices that use lights or vibrations. These  ringing sounds in their ears once in a while. You may hear a roar, a hiss, a tinkle, or a buzz. The sound usually lasts only a few minutes. If it goes on all the time, you may have tinnitus.    Tinnitus is usually caused by long-term exposure to loud noise. This damages the nerves in the inner ear. It can occur with all types of hearing loss. It may be a symptom of almost any ear problem.  Tinnitus may be caused by a buildup of earwax. Or, it may be caused by ear infections or certain medicines (especially antibiotics or large amounts of aspirin). You can also hear noises in your ears because of an injury to the ears, drinking too much alcohol or caffeine, or a medical condition.  Other conditions may also contribute to tinnitus, including: head and neck trauma, temporomandibular joint disorder (TMJ), sinus pressure and barometric trauma, traumatic brain injury, metabolic disorders, autoimmune disorders, stress, and high blood pressure.    You may need tests to evaluate your hearing and to find causes of long-lasting tinnitus.  Your doctor may suggest one or more treatments to help you cope with the tinnitus. You can also do things at home to help reduce symptoms.    Follow-up care is a key part of your treatment and safety. Be sure to make and go to all appointments, and call your doctor if you are having problems. It's also a good idea to know your test results and keep a list of the medicines you take.    How can you care for yourself at home?  Limit or cut out alcohol, caffeine, and sodium. They can make your symptoms worse.  Do not smoke or use other tobacco products. Nicotine reduces blood flow to the ear and makes tinnitus worse. If you need help quitting, talk to your doctor about stop-smoking programs and medicines. These can increase your chances of quitting for good.  Talk to your doctor about whether to stop taking aspirin and similar products such as ibuprofen or naproxen.  Get exercise often. It can

## 2024-08-26 NOTE — PROGRESS NOTES
Hernesto Gurrola   1970, 53 y.o. male   [id number]       Referring Provider: Femi Schaefer MD  Referral Type: In an order in Epic    Reason for Visit: Evaluation of suspected change in hearing, tinnitus, or balance.    ADULT AUDIOLOGIC EVALUATION      Hernesto Gurrola is a 53 y.o. male seen today, 8/26/2024 , for an initial audiologic evaluation.  Patient was seen by Femi Schaefer MD following today's evaluation.    AUDIOLOGIC AND OTHER PERTINENT MEDICAL HISTORY:      Hernesto Gurrola reports that 6-7 weeks ago, he fell backwards and hit his head. He noted that since the fall, he has had a constant ringing in both ears that is very bothersome. He also reported occasional imbalance since the fall. He noted some difficulty hearing, specifically in understanding speech. He stated that his hearing is symmetrical between the ears. He endorsed a history of occupational noise exposure.     He denied otalgia, otorrhea, history of ear surgery, and family history of hearing loss    Date: 8/26/2024     IMPRESSIONS:      Today's results revealed a Normal sloping to a Severe noise notch at 4000 Hz rising to a Moderately-Severe Sensorineural hearing loss bilaterally. Excellent speech understanding when in quiet. Tympanometry indicates normal middle ear function. Discussed test results and implications with patient. Discussed possible benefits of amplification.  Discussed use of tinnitus management strategies. Provided basic tinnitus education, including the neurophysiological model. Discussed noise exposure and the importance of appropriate hearing protection when in hazardous noise environments.    Follow medical recommendations of Femi Schaefer MD.     ASSESSMENT AND FINDINGS:     Otoscopy unremarkable.    RIGHT EAR:  Hearing Sensitivity: Normal sloping to a Severe noise notch at 4000 Hz rising to a Moderately-Severe Sensorineural hearing loss  Speech Recognition Threshold: 25 dB HL  Word Recognition: Excellent  96%, based on NU-6 by-difficulty list at 65 dBHL using recorded speech stimuli.    Tympanometry: Normal peak pressure and compliance, Type A tympanogram, consistent with normal middle ear function.       LEFT EAR:  Hearing Sensitivity: Normal sloping to a Severe noise notch at 4000 Hz rising to a Moderately-Severe Sensorineural hearing loss  Speech Recognition Threshold: 25 dB HL  Word Recognition: Excellent 96%, based on NU-6 by-difficulty list at 65 dBHL using recorded speech stimuli.    Tympanometry: Normal peak pressure and compliance, Type A tympanogram, consistent with normal middle ear function.       Reliability: Good   Transducer: HF Headphones    Patient completed Tinnitus Handicap Inventory (THI) today. THI Score: 66 (Grade 4, Severe)          See scanned audiogram dated 8/26/2024 for results.        PATIENT EDUCATION:       The following items were discussed with the patient:   - Good Communication Strategies  - Hearing Loss and Hearing Aids  - Tinnitus Management Strategies    - Noise-Induced Hearing Loss and use of Hearing Protection Devices (HPDs)     Educational information was shared in the After Visit Summary.                                                RECOMMENDATIONS:                                                                                                                                                                                                                                                            The following items are recommended based on patient report and results from today's appointment:   - Continue medical follow-up with Femi Schaefer MD.   - Retest hearing as medically indicated and/or sooner if a change in hearing is noted.  - If desired, schedule a Hearing Aid Evaluation (HAE) appointment to discuss hearing aid options.  - Utilize \"Good Communication Strategies\" as discussed to assist in speech understanding with communication partners.  - Maintain a sound enriched

## 2024-08-26 NOTE — PROGRESS NOTES
[facility]  DIVISION OF OTOLARYNGOLOGY- HEAD & NECK SURGERY  CONSULT      Patient Name: Hernesto Gurrola  Medical Record Number:  [id number]  Primary Care Physician:  Alessandro Yang DO  Date of Consultation: 8/26/2024    Chief Complaint: Ear issues        HISTORY OF PRESENT ILLNESS  Hernesto is a(n) 53 y.o. male who presents for evaluation of ear issues.  The patient said he has been having persistent tinnitus for the past 6 weeks or so.  He said he fell onto his back.  He does not really think he hit his head hard.  He was not evaluated at that time.  The next day started having significant tinnitus.  He also notes his hearing was not as good as it was prior to this.  He does not really have a significant ear history, but does have a history of noise exposure.  He said that prior to this event he did have some intermittent tinnitus, but nothing is persistent.  He denies other neurologic issues such as dizziness, headache or photosensitivity.            REVIEW OF SYSTEMS  As above    PHYSICAL EXAM  GENERAL: No Acute Distress, Alert and Oriented, no Hoarseness, strong voice  EYES: EOMI, Anti-icteric  HENT:   Head: Normocephalic and atraumatic.   Face:  Symmetric, facial nerve intact, no sinus tenderness  Right Ear: Normal external ear, normal external auditory canal, intact tympanic membrane with normal mobility and aerated middle ear  Left Ear: Normal external ear, normal external auditory canal, intact tympanic membrane with normal mobility and aerated middle ear  Mouth/Oral Cavity:  normal lips, Uvula is midline, no mucosal lesions, no trismus  Oropharynx/Larynx:  normal oropharynx,  Nose:Normal external nasal appearance.    NECK: Normal range of motion, no thyromegaly, trachea is midline, no lymphadenopathy, no neck masses, no crepitus    Patient an audiogram that shows normal sloping to severe sensorineural hearing loss with type a tympanograms        ASSESSMENT/PLAN  1. Sensorineural hearing loss  (SNHL) of both ears  Likely secondary to noise exposure.  He would certainly benefit from hearing aids.    2. Tinnitus of both ears  He has had a little bit of tinnitus for a while, but it has been particularly bad the last 6 weeks.  He said he fell onto his back.  He did not think he hit his head, but the tenderness has been very bad since that time.  He did not have an evaluation for this.  He could have some concussive symptoms contributing to the tinnitus at this time.  I will order CT scan of the head just to make sure there is no evidence of an intracranial bleed.  I will call him with the results.  Otherwise I think the hearing aids would help with the tinnitus.  If it persist even after hearing aids we could consider tinnitus retraining therapy.    3. Traumatic injury of head, initial encounter  I will call him with the results of the CT of the head  - CT HEAD WO CONTRAST; Future             I have performed a head and neck physical exam personally or was physically present during the key or critical portions of the service.    This note was generated completely or in part utilizing Dragon dictation speech recognition software.  Occasionally, words are mistranscribed and despite editing, the text may contain inaccuracies due to incorrect word recognition.  If further clarification is needed please contact the office at [phone].

## 2024-08-30 NOTE — PROGRESS NOTES
Hernesto Gurrola  1970.53 y.o. male   [id number]      HEARING AID EVALUATION    Hernesto Gurrola was seen today, 9/3/2024 , for a Hearing Aid Evaluation following audiologic evaluation on 8/26/24. Patient has no prior history of hearing aid use. Patient was found to have no direct insurance benefit for hearing aids with access to possible hearing aid discount through third party  ([facility]). Patient understands he is responsible for any cost insurance does not cover. Hearing aid benefit worksheet scanned into media tab.     DATE: 9/3/2024     LIFESTYLE EVALUATION:      Primary Complaint: Tinnitus, hearing in noise    Phone Difficulty:   Is connectivity important to you?: Yes    Ulises Use: yes - IPhone       After a discussion of evaluation results, discussion of levels of technology and prices, and lifestyle assessment. Hernesto Gurrola has decided to consider the options and contact Audiology when a decision has been made.. Color P7,  power , [facility], . Signed medical evaluation waiver and evaluation agreement.    Technology to be considered: Phonak Audeo L30-RL for $2,200.00 or Phonak Audeo L50-RL for $3,000.00      No charge for today's appointment.    PATIENT EDUCATION:      - Verbally reviewed benefits and limitations of devices and available features in hearing aids.    - Verbally discussed realistic expectations of hearing aid use     RECOMMENDATIONS:      - Contact Audiology when a decision has been made to pursue hearing aids.        Rahat Braden  Audiologist      NO CHARGE

## 2024-09-03 ENCOUNTER — PROCEDURE VISIT (OUTPATIENT)
Dept: AUDIOLOGY | Age: 54
End: 2024-09-03

## 2024-09-03 DIAGNOSIS — H90.3 SENSORINEURAL HEARING LOSS (SNHL) OF BOTH EARS: Primary | ICD-10-CM

## 2024-09-16 ENCOUNTER — TELEPHONE (OUTPATIENT)
Dept: ENT CLINIC | Age: 54
End: 2024-09-16

## 2024-09-16 NOTE — TELEPHONE ENCOUNTER
----- Message from Una MUSE sent at 8/26/2024  1:05 PM EDT -----    ----- Message -----  From: Carissa Daniel AuD  Sent: 8/26/2024   8:49 AM EDT  To: Rahat Rodriguez    Constant, bothersome tinnitus began 6-7 weeks ago after a fall. Prior to the fall, he had occasional tinnitus. Sending you his information, should the tinnitus not resolve. 66 on THI.

## 2025-02-26 DIAGNOSIS — Z87.39 HISTORY OF GOUT: ICD-10-CM

## 2025-02-26 DIAGNOSIS — M51.34 DDD (DEGENERATIVE DISC DISEASE), THORACIC: ICD-10-CM

## 2025-02-26 DIAGNOSIS — M51.369 DDD (DEGENERATIVE DISC DISEASE), LUMBAR: ICD-10-CM

## 2025-02-26 RX ORDER — ALLOPURINOL 300 MG/1
TABLET ORAL
Qty: 90 TABLET | Refills: 3 | OUTPATIENT
Start: 2025-02-26

## 2025-02-26 RX ORDER — METOPROLOL SUCCINATE 50 MG/1
50 TABLET, EXTENDED RELEASE ORAL DAILY
Qty: 90 TABLET | Refills: 1 | OUTPATIENT
Start: 2025-02-26

## 2025-02-26 RX ORDER — CELECOXIB 200 MG/1
200 CAPSULE ORAL DAILY
Qty: 90 CAPSULE | Refills: 3 | OUTPATIENT
Start: 2025-02-26

## 2025-02-26 NOTE — TELEPHONE ENCOUNTER
Last office visit 8/12/2024       Next office visit scheduled Visit date not found    Requested Prescriptions     Pending Prescriptions Disp Refills    allopurinol (ZYLOPRIM) 300 MG tablet 90 tablet 3     Sig: TAKE 1 TABLET BY MOUTH  DAILY    celecoxib (CELEBREX) 200 MG capsule 90 capsule 3     Sig: Take 1 capsule by mouth daily    metoprolol succinate (TOPROL XL) 50 MG extended release tablet 90 tablet 1     Sig: Take 1 tablet by mouth daily   \

## 2025-02-26 NOTE — TELEPHONE ENCOUNTER
Patient called requesting refill on his Allopurinol 300 MG,   Celecoxib 200 MG  Metroprolol succinate 50 MG    Pharm[facility]:  [facility] [phone]    Patient scheduled.

## 2025-03-04 ASSESSMENT — PATIENT HEALTH QUESTIONNAIRE - PHQ9
1. LITTLE INTEREST OR PLEASURE IN DOING THINGS: NOT AT ALL
SUM OF ALL RESPONSES TO PHQ QUESTIONS 1-9: 0
2. FEELING DOWN, DEPRESSED OR HOPELESS: NOT AT ALL
SUM OF ALL RESPONSES TO PHQ9 QUESTIONS 1 & 2: 0
1. LITTLE INTEREST OR PLEASURE IN DOING THINGS: NOT AT ALL
SUM OF ALL RESPONSES TO PHQ QUESTIONS 1-9: 0
2. FEELING DOWN, DEPRESSED OR HOPELESS: NOT AT ALL

## 2025-03-10 ASSESSMENT — ENCOUNTER SYMPTOMS: SHORTNESS OF BREATH: 0

## 2025-03-10 NOTE — PROGRESS NOTES
Subjective:      Patient ID: Hernesto Gurrola is a 54 y.o. male.    COMPLETE PHYSICAL:    Patient is here today for a complete physical.  He is feeling well and is fasting for labs.      Hypertension  This is a chronic problem. The current episode started more than 1 year ago. The problem is unchanged. The problem is controlled. Associated symptoms include peripheral edema. Pertinent negatives include no chest pain, palpitations or shortness of breath. There are no associated agents to hypertension. Risk factors for coronary artery disease include family history and male gender. Past treatments include beta blockers. The current treatment provides significant improvement. There are no compliance problems.      Gout:  Patient is tolerating and compliant with Allopurinol 300 mg daily.  He takes Celebrex 200 mg daily prn.  He denies any gout attacks since his last visit, but states that he will have a day or two of joint aches periodically.  He is now due for a Uric Acid.       DDD Lumbar and Thoracic:  Patient was injured on his job in 2017.  He has since seen 7 spinal surgeons and was told by multiple ones that he needed surgery for his back.  He did not have any.  He has had epidural injections.  He is no longer employed there and his workman's comp case is closed.  He takes Celebrex 200 mg daily as needed.  He feels that the medication keeps him functional.       Allergies   Allergen Reactions    Dilaudid [Hydromorphone Hcl] Itching     Current Outpatient Medications   Medication Sig Dispense Refill    metoprolol succinate (TOPROL XL) 50 MG extended release tablet Take 1 tablet by mouth daily 90 tablet 1    celecoxib (CELEBREX) 200 MG capsule TAKE 1 CAPSULE BY MOUTH DAILY 90 capsule 3    allopurinol (ZYLOPRIM) 300 MG tablet TAKE 1 TABLET BY MOUTH DAILY 90 tablet 3     No current facility-administered medications for this visit.     Past Medical History:   Diagnosis Date    CAD (coronary artery disease)

## 2025-03-11 ENCOUNTER — OFFICE VISIT (OUTPATIENT)
Dept: FAMILY MEDICINE CLINIC | Age: 55
End: 2025-03-11
Payer: COMMERCIAL

## 2025-03-11 VITALS
HEIGHT: 71 IN | BODY MASS INDEX: 36.54 KG/M2 | SYSTOLIC BLOOD PRESSURE: 128 MMHG | DIASTOLIC BLOOD PRESSURE: 86 MMHG | WEIGHT: 261 LBS

## 2025-03-11 DIAGNOSIS — M51.360 DEGENERATION OF INTERVERTEBRAL DISC OF LUMBAR REGION WITH DISCOGENIC BACK PAIN: ICD-10-CM

## 2025-03-11 DIAGNOSIS — Z23 NEED FOR PNEUMOCOCCAL 20-VALENT CONJUGATE VACCINATION: ICD-10-CM

## 2025-03-11 DIAGNOSIS — Z23 NEED FOR SHINGLES VACCINE: ICD-10-CM

## 2025-03-11 DIAGNOSIS — M51.34 DDD (DEGENERATIVE DISC DISEASE), THORACIC: ICD-10-CM

## 2025-03-11 DIAGNOSIS — Z23 NEED FOR TDAP VACCINATION: ICD-10-CM

## 2025-03-11 DIAGNOSIS — Z00.00 WELL ADULT EXAM: Primary | ICD-10-CM

## 2025-03-11 DIAGNOSIS — Z87.39 HISTORY OF GOUT: ICD-10-CM

## 2025-03-11 DIAGNOSIS — I10 ESSENTIAL HYPERTENSION: ICD-10-CM

## 2025-03-11 DIAGNOSIS — Z00.00 PREVENTATIVE HEALTH CARE: ICD-10-CM

## 2025-03-11 PROCEDURE — 99396 PREV VISIT EST AGE 40-64: CPT | Performed by: FAMILY MEDICINE

## 2025-03-11 PROCEDURE — 3079F DIAST BP 80-89 MM HG: CPT | Performed by: FAMILY MEDICINE

## 2025-03-11 PROCEDURE — 3074F SYST BP LT 130 MM HG: CPT | Performed by: FAMILY MEDICINE

## 2025-03-11 PROCEDURE — 36415 COLL VENOUS BLD VENIPUNCTURE: CPT | Performed by: FAMILY MEDICINE

## 2025-03-11 RX ORDER — METOPROLOL SUCCINATE 50 MG/1
50 TABLET, EXTENDED RELEASE ORAL DAILY
Qty: 90 TABLET | Refills: 1 | Status: SHIPPED | OUTPATIENT
Start: 2025-03-11

## 2025-03-11 RX ORDER — ALLOPURINOL 300 MG/1
TABLET ORAL
Qty: 90 TABLET | Refills: 1 | Status: SHIPPED | OUTPATIENT
Start: 2025-03-11

## 2025-03-11 RX ORDER — CELECOXIB 200 MG/1
200 CAPSULE ORAL DAILY
Qty: 90 CAPSULE | Refills: 1 | Status: SHIPPED | OUTPATIENT
Start: 2025-03-11

## 2025-03-11 SDOH — ECONOMIC STABILITY: FOOD INSECURITY: WITHIN THE PAST 12 MONTHS, THE FOOD YOU BOUGHT JUST DIDN'T LAST AND YOU DIDN'T HAVE MONEY TO GET MORE.: NEVER TRUE

## 2025-03-11 SDOH — ECONOMIC STABILITY: FOOD INSECURITY: WITHIN THE PAST 12 MONTHS, YOU WORRIED THAT YOUR FOOD WOULD RUN OUT BEFORE YOU GOT MONEY TO BUY MORE.: NEVER TRUE

## 2025-03-11 ASSESSMENT — ENCOUNTER SYMPTOMS
WHEEZING: 0
VOMITING: 0
COUGH: 0
RHINORRHEA: 0
ABDOMINAL PAIN: 0
SORE THROAT: 0
NAUSEA: 0
DIARRHEA: 0
CONSTIPATION: 0
BLOOD IN STOOL: 0

## 2025-03-12 ENCOUNTER — RESULTS FOLLOW-UP (OUTPATIENT)
Dept: FAMILY MEDICINE CLINIC | Age: 55
End: 2025-03-12

## 2025-03-12 LAB
ALT SERPL-CCNC: 50 U/L (ref 10–40)
ANION GAP SERPL CALCULATED.3IONS-SCNC: 11 MMOL/L (ref 3–16)
AST SERPL-CCNC: 43 U/L (ref 15–37)
BUN SERPL-MCNC: 12 MG/DL (ref 7–20)
CALCIUM SERPL-MCNC: 9.3 MG/DL (ref 8.3–10.6)
CHLORIDE SERPL-SCNC: 107 MMOL/L (ref 99–110)
CHOLEST SERPL-MCNC: 129 MG/DL (ref 0–199)
CO2 SERPL-SCNC: 24 MMOL/L (ref 21–32)
CREAT SERPL-MCNC: 1 MG/DL (ref 0.9–1.3)
DEPRECATED RDW RBC AUTO: 14.5 % (ref 12.4–15.4)
GFR SERPLBLD CREATININE-BSD FMLA CKD-EPI: 90 ML/MIN/{1.73_M2}
GLUCOSE SERPL-MCNC: 91 MG/DL (ref 70–99)
HCT VFR BLD AUTO: 47.8 % (ref 40.5–52.5)
HDLC SERPL-MCNC: 21 MG/DL (ref 40–60)
HGB BLD-MCNC: 15.9 G/DL (ref 13.5–17.5)
LDLC SERPL CALC-MCNC: 70 MG/DL
MCH RBC QN AUTO: 30.2 PG (ref 26–34)
MCHC RBC AUTO-ENTMCNC: 33.3 G/DL (ref 31–36)
MCV RBC AUTO: 90.5 FL (ref 80–100)
PLATELET # BLD AUTO: 327 K/UL (ref 135–450)
PMV BLD AUTO: 8.6 FL (ref 5–10.5)
POTASSIUM SERPL-SCNC: 4.6 MMOL/L (ref 3.5–5.1)
RBC # BLD AUTO: 5.28 M/UL (ref 4.2–5.9)
SODIUM SERPL-SCNC: 142 MMOL/L (ref 136–145)
TRIGL SERPL-MCNC: 189 MG/DL (ref 0–150)
TSH SERPL DL<=0.005 MIU/L-ACNC: 2.23 UIU/ML (ref 0.27–4.2)
URATE SERPL-MCNC: 7.3 MG/DL (ref 3.5–7.2)
VLDLC SERPL CALC-MCNC: 38 MG/DL
WBC # BLD AUTO: 9.1 K/UL (ref 4–11)